# Patient Record
Sex: FEMALE | Race: WHITE | NOT HISPANIC OR LATINO
[De-identification: names, ages, dates, MRNs, and addresses within clinical notes are randomized per-mention and may not be internally consistent; named-entity substitution may affect disease eponyms.]

---

## 2019-09-13 PROBLEM — Z00.00 ENCOUNTER FOR PREVENTIVE HEALTH EXAMINATION: Status: ACTIVE | Noted: 2019-09-13

## 2019-09-20 ENCOUNTER — APPOINTMENT (OUTPATIENT)
Dept: ORTHOPEDIC SURGERY | Facility: CLINIC | Age: 69
End: 2019-09-20
Payer: MEDICARE

## 2019-09-20 DIAGNOSIS — Z80.9 FAMILY HISTORY OF MALIGNANT NEOPLASM, UNSPECIFIED: ICD-10-CM

## 2019-09-20 DIAGNOSIS — M17.11 UNILATERAL PRIMARY OSTEOARTHRITIS, RIGHT KNEE: ICD-10-CM

## 2019-09-20 DIAGNOSIS — Z87.39 PERSONAL HISTORY OF OTHER DISEASES OF THE MUSCULOSKELETAL SYSTEM AND CONNECTIVE TISSUE: ICD-10-CM

## 2019-09-20 DIAGNOSIS — Z82.61 FAMILY HISTORY OF ARTHRITIS: ICD-10-CM

## 2019-09-20 DIAGNOSIS — M25.561 PAIN IN RIGHT KNEE: ICD-10-CM

## 2019-09-20 DIAGNOSIS — Z82.62 FAMILY HISTORY OF OSTEOPOROSIS: ICD-10-CM

## 2019-09-20 DIAGNOSIS — Z86.39 PERSONAL HISTORY OF OTHER ENDOCRINE, NUTRITIONAL AND METABOLIC DISEASE: ICD-10-CM

## 2019-09-20 PROCEDURE — 99204 OFFICE O/P NEW MOD 45 MIN: CPT

## 2019-09-20 PROCEDURE — 73564 X-RAY EXAM KNEE 4 OR MORE: CPT | Mod: RT

## 2019-09-20 PROCEDURE — 73560 X-RAY EXAM OF KNEE 1 OR 2: CPT | Mod: LT

## 2019-09-20 NOTE — DISCUSSION/SUMMARY
[de-identified] : Discussed at length the nature of the patients condition. Their right knee symptoms appear secondary to degenerative arthritis. I believe she would benefit from a right TKR. The risks, benefits, convalescence and alternatives were reviewed. Numerous questions were asked and answered. Models were used as an educational tool.. Preop medical clearance. However, she has a new grandchild coming in November and may consider deferring surgery until afterwards. Therefore, we will seek authorization for right knee Euflexxa injections. Once we receive authorization, we will proceed accordingly. I also recommended PT and continuation of Mobic and Voltaren Gel. She will call the office in regards to her decision. She declined a cortisone injection today.

## 2019-09-20 NOTE — HISTORY OF PRESENT ILLNESS
[de-identified] : 69 year old female presents for initial evaluation of right knee pain for many years. She states 7 months ago she had an exacerbation of her right knee pain following a twisting injury to her right knee in February. She saw rheumatologist Dr. Marx, who placed her on Mobic and did not diagnose her with any rheumatologic conditions. 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 Her pain is located mostly medially and is sharp and dull intermittently with achy pain.  She reports buckling and clicking but denies any other mechanical symptoms. She can walk unlimited distance with pain, without a cane or walker. She uses a brace intermittently with mild relief. Patient takes the stairs one at a time using the handrail. She has utilized Mobic, CBD oils and Voltaren gel, all with mild relief. Patient had a cortisone injection and aspiration many years ago. She has no history of prior knee surgery, or injection therapy. She PT 4 months ago with mild relief. Dr. Bradshaw obtained an MRI in May, which showed a lateral posterior meniscal tear with diffuse tricompartmental degenerative arthritis.

## 2019-09-20 NOTE — PHYSICAL EXAM
[de-identified] : General appearance: well nourished and hydrated, pleasant, alert and oriented x 3, cooperative.\par HEENT: Normocephalic, EOM intact, Nasal septum midline, Oral cavity clear, External auditory canal clear.\par Cardiovascular: no apparent abnormalities, no lower leg edema, no varicosities, pedal pulses are palpable.\par Lymphatics Lymph nodes: none palpated, Lymphedema: not present.\par Neurologic: sensation is normal, no muscle weakness in upper or lower extremities, patella tendon reflexes intact .\par Dermatologic no apparent skin lesions, moist, warm, no rash.\par Spine: cervical spine appears normal and moves freely, thoracic spine appears normal and moves freely, lumbosacral spine appears normal and moves freely.\par Gait: nonantalgic.\par \par Left knee\par Inspection: no effusion or erythema.\par Wounds: none.\par Alignment: normal.\par Palpation: no specific tenderness on palpation.\par ROM active (in degrees): 0-135 with mild crepitus \par Ligamentous laxity: all ligaments appear stable,, negative ant. drawer test, negative post. drawer test, stable to varus stress test, stable to valgus stress test. negative Lachman's test, negative pivot shift test\par Meniscal Test: negative McMurrays, negative Alecia.\par Patellofemoral Alignment Test: Q angle-, normal.\par Muscle Test: good quad strength.\par Leg examination: calf is soft and non-tender.\par \par Right knee\par Inspection: no effusion or erythema.\par Wounds: none.\par Alignment: normal.\par Palpation: mild medial discomfort on palpation.\par ROM active (in degrees):  with crepitus \par Ligamentous laxity: all ligaments appear stable,, negative ant. drawer test, negative post. drawer test, stable to varus stress test, stable to valgus stress test. negative Lachman's test, negative pivot shift test\par Meniscal Test: negative McMurrays, negative Alecia.\par Patellofemoral Alignment Test: Q angle-, normal.\par Muscle Test: good quad strength.\par Leg examination: calf is soft and non-tender.\par \par Left hip\par Inspection: No swelling or ecchymosis.\par Wounds: none.\par Palpation: non-tender.\par Stability: no instability.\par Strength: 5/5 all motor groups.\par ROM: no pain with FROM.\par Leg length: equal.\par \par Right hip\par Inspection: No swelling or ecchymosis.\par Wounds: none.\par Palpation: non-tender.\par Stability: no instability.\par Strength: 5/5 all motor groups.\par ROM: no pain with FROM.\par Leg length: equal.\par \par Left ankle\par Inspection: no erythema noted, no swelling noted.\par Palpation: no pain on palpation .\par ROM: FROM without crepitus.\par Muscle strength: 5/5.\par Stability: no instability noted.\par \par Right ankle\par Inspection: no erythema noted, no swelling noted.\par ROM: FROM without crepitus.\par Palpation: no pain on palpation .\par Muscle strength: 5/5.\par Stability: no instability noted.\par \par Left foot\par Inspection: color, texture and turgor are normal.\par ROM: full range of motion of all joints without pain or crepitus.\par Palpation: no tenderness.\par Stability: no instability noted.\par \par Right foot\par Inspection: color, texture and turgor are normal.\par ROM: full range of motion of all joints without pain or crepitus.\par Palpation: no tenderness.\par Stability: no instability noted.\par \par Left shoulder\par Inspection: no muscle asymmetry, no atrophy.\par Palpation: no tenderness noted, ACJ non-tender.\par ROM: limited total elevation and abduction, otherwise FROM\par Strength testing): anterior deltoid, supraspinatus, infraspinatus, subscapularis all 5/5.\par Stability test: ant. apprehension negative, post. apprehension negative, relocation test negative.\par Impingement Test: negative NEER.\par \par Right shoulder\par Inspection: no muscle asymmetry, no atrophy.\par Palpation: no tenderness noted, ACJ non-tender.\par ROM: full active ROM, full passive ROM.\par Strength testing): anterior deltoid, supraspinatus, infraspinatus, subscapularis all 5/5.\par Stability test: ant. apprehension negative, post. apprehension negative, relocation test negative.\par Impingement Test: negative NEER.\par Surgical Wounds: none.\par \par Left elbow\par Inspection: negative swelling.\par Wounds: none.\par Palpation: non-tender.\par ROM: full ROM.\par Strength: 5/5 all groups.\par Stability: no instability.\par Mass: none.\par \par Right elbow\par Inspection: negative swelling.\par Wounds: none.\par Palpation: non-tender.\par ROM: full ROM.\par Strength: 5/5 all groups.\par Stability: no instability.\par Mass: none.\par \par Left wrist\par Inspection: negative swelling.\par Wound: none.\par Palpation (bone): no tenderness.\par ROM: full ROM.\par Strength: full , good.\par \par Right wrist\par Inspection: negative swelling.\par Wound: none.\par Palpation (bone): no tenderness.\par ROM: full ROM.\par Strength: full , good.\par \par Left hand\par Inspection: no skin changes, normal appearance.\par Wounds: none.\par Strength: full , able to make full fist.\par Sensation: light touch intact all fingers and thumb.\par Vascular: good capillary refill < 3 seconds, all fingers and thumb.\par Mass: none.\par \par Right hand\par Inspection: no skin changes, normal appearance. \par Wounds: none.\par Palpation: non-tender throughout.\par Strength: full , able to make full fist.\par Sensation: light touch intact all fingers and thumb.\par Vascular: good capillary refill < 3 seconds, all fingers and thumb.\par Mass: none.\par   [de-identified] : Left knee xray merchant view taken at the office today shows the patella in a central position with mild joint space narrowing consistent with patellofemoral arthritis \par \par Right knee xrays, standing AP/Lateral, Merchant, and 45 degree PA standing view, taken at the office today shows diffuse tricompartmental degenerative arthritis, medial joint space narrowing, sclerosis, bone on bone, patella sits at an appropriate height in a central position with joint space narrowing and osteophyte formation, Kellgren and Lucio grade 4\par  \par MRI Right knee taken 5/4/2019: Films brought in and reviewed, see attached report.\par

## 2019-09-20 NOTE — ADDENDUM
[FreeTextEntry1] : This note was written by Jessica Norris on 09/20/2019 acting as scribe for Dr. Gary Ibrahim M.D.\par \par I, Dr. Gary Ibrahim M.D., have read and attest that all the information, medical decision making and discharge instructions within are true and accurate.\par

## 2019-12-09 ENCOUNTER — APPOINTMENT (OUTPATIENT)
Dept: CARDIOLOGY | Facility: CLINIC | Age: 69
End: 2019-12-09
Payer: MEDICARE

## 2019-12-09 ENCOUNTER — NON-APPOINTMENT (OUTPATIENT)
Age: 69
End: 2019-12-09

## 2019-12-09 VITALS
SYSTOLIC BLOOD PRESSURE: 154 MMHG | BODY MASS INDEX: 27.88 KG/M2 | OXYGEN SATURATION: 97 % | HEIGHT: 60 IN | HEART RATE: 67 BPM | WEIGHT: 142 LBS | DIASTOLIC BLOOD PRESSURE: 91 MMHG

## 2019-12-09 DIAGNOSIS — E78.5 HYPERLIPIDEMIA, UNSPECIFIED: ICD-10-CM

## 2019-12-09 PROCEDURE — 99204 OFFICE O/P NEW MOD 45 MIN: CPT

## 2019-12-09 PROCEDURE — 93000 ELECTROCARDIOGRAM COMPLETE: CPT

## 2019-12-09 RX ORDER — TERBUTALINE SULFATE 1 MG/ML
VIAL (ML) SUBCUTANEOUS
Refills: 0 | Status: ACTIVE | COMMUNITY

## 2019-12-09 NOTE — REASON FOR VISIT
[Initial Evaluation] : an initial evaluation of [Hyperlipidemia] : hyperlipidemia [FreeTextEntry1] : Preop

## 2019-12-09 NOTE — DISCUSSION/SUMMARY
[FreeTextEntry1] : This is a 69-year-old white female who has hyperlipidemia treated with Zetia. Otherwise she has no risk factors for heart disease. She has very good functional status without any cardiac symptoms.\par \par I would like to review blood work, echo and carotid Doppler from your office. Once I review these tests I would decide if any further testing is necessary prior to her knee replacement surgery.\par \par Blood pressure is elevated in my office today but she seems to be nervous and does have anxiety issues. According to the patient blood pressure in your office has been normal.\par \par This was discussed with the patient and I answered her questions.

## 2019-12-09 NOTE — HISTORY OF PRESENT ILLNESS
[FreeTextEntry1] : I saw Karli Larios in the office today for cardiac evaluation. She is a 69-year-old white female has been in good general health. Specifically she has no history of hypertension, diabetes, smoking, or family history of heart disease. She does take Zetia for hyperlipidemia. She also has asthma that has been under good control and has had no exacerbations requiring ER visits or hospitalizations.\par \par Patient has had good functional status up until recently when she hurt her knee and now requires left knee replacement. As a result she is now having difficulty. She still is physically active and walks on a daily basis. She was participating in aerobic exercise class and hour and a half every day, and walks her dog several times a day and has no chest pain, shortness of breath, or palpitation.\par \par The patient has had echo and carotid Doppler in your office. I would appreciate a copy for my review. Also will  blood work.\par \par She is scheduled for knee replacement surgery in January.\par \par Resting 12 lead electrocardiogram demonstrates sinus rhythm and appears to be normal.

## 2019-12-09 NOTE — PHYSICAL EXAM
[General Appearance - Well Developed] : well developed [Normal Appearance] : normal appearance [Well Groomed] : well groomed [General Appearance - Well Nourished] : well nourished [No Deformities] : no deformities [Normal Oral Mucosa] : normal oral mucosa [General Appearance - In No Acute Distress] : no acute distress [Normal Conjunctiva] : the conjunctiva exhibited no abnormalities [Normal Jugular Venous V Waves Present] : normal jugular venous V waves present [No Jugular Venous Edwards A Waves] : no jugular venous edwards A waves [Normal Jugular Venous A Waves Present] : normal jugular venous A waves present [Not Palpable] : not palpable [Normal Rate] : normal [Normal S1] : normal S1 [Normal S2] : normal S2 [2+] : left 2+ [No Abnormalities] : the abdominal aorta was not enlarged and no bruit was heard [No Pitting Edema] : no pitting edema present [1+] : left 1+ [Respiration, Rhythm And Depth] : normal respiratory rhythm and effort [Exaggerated Use Of Accessory Muscles For Inspiration] : no accessory muscle use [Bowel Sounds] : normal bowel sounds [Auscultation Breath Sounds / Voice Sounds] : lungs were clear to auscultation bilaterally [Abdomen Soft] : soft [Abnormal Walk] : normal gait [Abdomen Tenderness] : non-tender [Nail Clubbing] : no clubbing of the fingernails [Cyanosis, Localized] : no localized cyanosis [Gait - Sufficient For Exercise Testing] : the gait was sufficient for exercise testing [Skin Color & Pigmentation] : normal skin color and pigmentation [Skin Turgor] : normal skin turgor [Oriented To Time, Place, And Person] : oriented to person, place, and time [] : no rash [No Anxiety] : not feeling anxious [Impaired Insight] : insight and judgment were intact [II] : a grade 2 [S3] : no S3 [S4] : no S4 [Right Carotid Bruit] : no bruit heard over the right carotid [Left Carotid Bruit] : no bruit heard over the left carotid [Left Femoral Bruit] : no bruit heard over the left femoral artery [Right Femoral Bruit] : no bruit heard over the right femoral artery

## 2020-01-08 ENCOUNTER — OUTPATIENT (OUTPATIENT)
Dept: OUTPATIENT SERVICES | Facility: HOSPITAL | Age: 70
LOS: 1 days | End: 2020-01-08
Payer: MEDICARE

## 2020-01-08 VITALS
HEART RATE: 75 BPM | SYSTOLIC BLOOD PRESSURE: 143 MMHG | TEMPERATURE: 98 F | RESPIRATION RATE: 16 BRPM | WEIGHT: 141.98 LBS | HEIGHT: 60 IN | DIASTOLIC BLOOD PRESSURE: 66 MMHG | OXYGEN SATURATION: 99 %

## 2020-01-08 DIAGNOSIS — Z29.9 ENCOUNTER FOR PROPHYLACTIC MEASURES, UNSPECIFIED: ICD-10-CM

## 2020-01-08 DIAGNOSIS — Z98.890 OTHER SPECIFIED POSTPROCEDURAL STATES: Chronic | ICD-10-CM

## 2020-01-08 DIAGNOSIS — Z01.818 ENCOUNTER FOR OTHER PREPROCEDURAL EXAMINATION: ICD-10-CM

## 2020-01-08 DIAGNOSIS — Z98.891 HISTORY OF UTERINE SCAR FROM PREVIOUS SURGERY: Chronic | ICD-10-CM

## 2020-01-08 DIAGNOSIS — M17.11 UNILATERAL PRIMARY OSTEOARTHRITIS, RIGHT KNEE: ICD-10-CM

## 2020-01-08 LAB
ANION GAP SERPL CALC-SCNC: 4 MMOL/L — LOW (ref 5–17)
APTT BLD: 29.4 SEC — SIGNIFICANT CHANGE UP (ref 27.5–36.3)
BASOPHILS # BLD AUTO: 0.04 K/UL — SIGNIFICANT CHANGE UP (ref 0–0.2)
BASOPHILS NFR BLD AUTO: 0.4 % — SIGNIFICANT CHANGE UP (ref 0–2)
BUN SERPL-MCNC: 26 MG/DL — HIGH (ref 7–23)
CALCIUM SERPL-MCNC: 9.1 MG/DL — SIGNIFICANT CHANGE UP (ref 8.5–10.1)
CHLORIDE SERPL-SCNC: 107 MMOL/L — SIGNIFICANT CHANGE UP (ref 96–108)
CO2 SERPL-SCNC: 28 MMOL/L — SIGNIFICANT CHANGE UP (ref 22–31)
CREAT SERPL-MCNC: 0.96 MG/DL — SIGNIFICANT CHANGE UP (ref 0.5–1.3)
EOSINOPHIL # BLD AUTO: 0 K/UL — SIGNIFICANT CHANGE UP (ref 0–0.5)
EOSINOPHIL NFR BLD AUTO: 0 % — SIGNIFICANT CHANGE UP (ref 0–6)
GLUCOSE SERPL-MCNC: 96 MG/DL — SIGNIFICANT CHANGE UP (ref 70–99)
HCT VFR BLD CALC: 37.5 % — SIGNIFICANT CHANGE UP (ref 34.5–45)
HGB BLD-MCNC: 13.2 G/DL — SIGNIFICANT CHANGE UP (ref 11.5–15.5)
IMM GRANULOCYTES NFR BLD AUTO: 0.4 % — SIGNIFICANT CHANGE UP (ref 0–1.5)
INR BLD: 1.04 RATIO — SIGNIFICANT CHANGE UP (ref 0.88–1.16)
LYMPHOCYTES # BLD AUTO: 1.97 K/UL — SIGNIFICANT CHANGE UP (ref 1–3.3)
LYMPHOCYTES # BLD AUTO: 18.5 % — SIGNIFICANT CHANGE UP (ref 13–44)
MCHC RBC-ENTMCNC: 32.4 PG — SIGNIFICANT CHANGE UP (ref 27–34)
MCHC RBC-ENTMCNC: 35.2 GM/DL — SIGNIFICANT CHANGE UP (ref 32–36)
MCV RBC AUTO: 92.1 FL — SIGNIFICANT CHANGE UP (ref 80–100)
MONOCYTES # BLD AUTO: 1.03 K/UL — HIGH (ref 0–0.9)
MONOCYTES NFR BLD AUTO: 9.7 % — SIGNIFICANT CHANGE UP (ref 2–14)
NEUTROPHILS # BLD AUTO: 7.56 K/UL — HIGH (ref 1.8–7.4)
NEUTROPHILS NFR BLD AUTO: 71 % — SIGNIFICANT CHANGE UP (ref 43–77)
PLATELET # BLD AUTO: 298 K/UL — SIGNIFICANT CHANGE UP (ref 150–400)
POTASSIUM SERPL-MCNC: 4.5 MMOL/L — SIGNIFICANT CHANGE UP (ref 3.5–5.3)
POTASSIUM SERPL-SCNC: 4.5 MMOL/L — SIGNIFICANT CHANGE UP (ref 3.5–5.3)
PROTHROM AB SERPL-ACNC: 11.6 SEC — SIGNIFICANT CHANGE UP (ref 10–12.9)
RBC # BLD: 4.07 M/UL — SIGNIFICANT CHANGE UP (ref 3.8–5.2)
RBC # FLD: 12.6 % — SIGNIFICANT CHANGE UP (ref 10.3–14.5)
SODIUM SERPL-SCNC: 139 MMOL/L — SIGNIFICANT CHANGE UP (ref 135–145)
WBC # BLD: 10.64 K/UL — HIGH (ref 3.8–10.5)
WBC # FLD AUTO: 10.64 K/UL — HIGH (ref 3.8–10.5)

## 2020-01-08 PROCEDURE — 71046 X-RAY EXAM CHEST 2 VIEWS: CPT

## 2020-01-08 PROCEDURE — 85025 COMPLETE CBC W/AUTO DIFF WBC: CPT

## 2020-01-08 PROCEDURE — 80048 BASIC METABOLIC PNL TOTAL CA: CPT

## 2020-01-08 PROCEDURE — 86900 BLOOD TYPING SEROLOGIC ABO: CPT

## 2020-01-08 PROCEDURE — 86850 RBC ANTIBODY SCREEN: CPT

## 2020-01-08 PROCEDURE — G0463: CPT | Mod: 25

## 2020-01-08 PROCEDURE — 93005 ELECTROCARDIOGRAM TRACING: CPT

## 2020-01-08 PROCEDURE — 87641 MR-STAPH DNA AMP PROBE: CPT

## 2020-01-08 PROCEDURE — 36415 COLL VENOUS BLD VENIPUNCTURE: CPT

## 2020-01-08 PROCEDURE — 86901 BLOOD TYPING SEROLOGIC RH(D): CPT

## 2020-01-08 PROCEDURE — 83036 HEMOGLOBIN GLYCOSYLATED A1C: CPT

## 2020-01-08 PROCEDURE — 93010 ELECTROCARDIOGRAM REPORT: CPT

## 2020-01-08 PROCEDURE — 87640 STAPH A DNA AMP PROBE: CPT

## 2020-01-08 PROCEDURE — 85730 THROMBOPLASTIN TIME PARTIAL: CPT

## 2020-01-08 PROCEDURE — 71046 X-RAY EXAM CHEST 2 VIEWS: CPT | Mod: 26

## 2020-01-08 PROCEDURE — 85610 PROTHROMBIN TIME: CPT

## 2020-01-08 NOTE — H&P PST ADULT - NSICDXFAMILYHX_GEN_ALL_CORE_FT
FAMILY HISTORY:  FH: colon cancer, mother  FH: hyperlipidemia, mother and father  FH: hypertension, mother

## 2020-01-08 NOTE — H&P PST ADULT - NS NEC GEN PE MLT EXAM PC
detailed exam Trilobed Flap Text: The defect edges were debeveled with a #15 scalpel blade.  Given the location of the defect and the proximity to free margins a trilobed flap was deemed most appropriate.  Using a sterile surgical marker, an appropriate trilobed flap drawn around the defect.    The area thus outlined was incised deep to adipose tissue with a #15 scalpel blade.  The skin margins were undermined to an appropriate distance in all directions utilizing iris scissors.

## 2020-01-08 NOTE — H&P PST ADULT - NSICDXPASTSURGICALHX_GEN_ALL_CORE_FT
PAST SURGICAL HISTORY:  H/O shoulder surgery left due to fracture    H/O:      History of breast surgery excision of papilloma right breast x 2    S/P repair of paraesophageal hernia

## 2020-01-08 NOTE — H&P PST ADULT - NSANTHOSAYNRD_GEN_A_CORE
No. LORRAINE screening performed.  STOP BANG Legend: 0-2 = LOW Risk; 3-4 = INTERMEDIATE Risk; 5-8 = HIGH Risk

## 2020-01-08 NOTE — H&P PST ADULT - ASSESSMENT
69 year old female presents to PST for planned right TKR    Plan:  1. PST instructions given ; NPO post midnight   2. Pt instructed to take following meds with sip of water : levothyroxine terbutaline ; pt to administer flonase Qvar Azeslastine   3. EZ wash instructions given & mupirocin instructions given ( only for positive nasal culture)   4. Medical Optimization  with Dr Geronimo Benavides   5. Stop NSAIDS ( Aspirin Alev Motrin Mobic Diclofenac), herbal supplements , MVI , Vitamin fish oil 7 days prior to surgery  unless directed by surgeon or cardiologist;   6. Labs EKG CXR as per surgeon request       CAPRINI SCORE [CLOT]    AGE RELATED RISK FACTORS                                                       MOBILITY RELATED FACTORS  [ ] Age 41-60 years                                            (1 Point)                  [ ] Bed rest                                                        (1 Point)  [x ] Age: 61-74 years                                           (2 Points)                 [ ] Plaster cast                                                   (2 Points)  [ ] Age= 75 years                                              (3 Points)                 [ ] Bed bound for more than 72 hours                 (2 Points)    DISEASE RELATED RISK FACTORS                                               GENDER SPECIFIC FACTORS  [ ] Edema in the lower extremities                       (1 Point)                  [ ] Pregnancy                                                     (1 Point)  [ ] Varicose veins                                               (1 Point)                  [ ] Post-partum < 6 weeks                                   (1 Point)             [ x] BMI > 25 Kg/m2                                            (1 Point)                  [ ] Hormonal therapy  or oral contraception          (1 Point)                 [ ] Sepsis (in the previous month)                        (1 Point)                  [ ] History of pregnancy complications                 (1 point)  [ ] Pneumonia or serious lung disease                                               [ ] Unexplained or recurrent                     (1 Point)           (in the previous month)                               (1 Point)  [ ] Abnormal pulmonary function test                     (1 Point)                 SURGERY RELATED RISK FACTORS  [ ] Acute myocardial infarction                              (1 Point)                 [ ]  Section                                             (1 Point)  [ ] Congestive heart failure (in the previous month)  (1 Point)               [ ] Minor surgery                                                  (1 Point)   [ ] Inflammatory bowel disease                             (1 Point)                 [ ] Arthroscopic surgery                                        (2 Points)  [ ] Central venous access                                      (2 Points)                [ ] General surgery lasting more than 45 minutes   (2 Points)       [ ] Stroke (in the previous month)                          (5 Points)               [x ] Elective arthroplasty                                         (5 Points)            ( ) past and present malignancy                             ( 2 points)                                                                                                                                    HEMATOLOGY RELATED FACTORS                                                 TRAUMA RELATED RISK FACTORS  [ ] Prior episodes of VTE                                     (3 Points)                 [ ] Fracture of the hip, pelvis, or leg                       (5 Points)  [ ] Positive family history for VTE                         (3 Points)                 [ ] Acute spinal cord injury (in the previous month)  (5 Points)  [ ] Prothrombin 52791 A                                     (3 Points)                 [ ] Paralysis  (less than 1 month)                             (5 Points)  [ ] Factor V Leiden                                             (3 Points)                  [ ] Multiple Trauma within 1 month                        (5 Points)  [ ] Lupus anticoagulants                                     (3 Points)                                                           [ ] Anticardiolipin antibodies                               (3 Points)                                                       [ ] High homocysteine in the blood                      (3 Points)                                             [ ] Other congenital or acquired thrombophilia      (3 Points)                                                [ ] Heparin induced thrombocytopenia                  (3 Points)                                          Total Score [       8   ]

## 2020-01-08 NOTE — H&P PST ADULT - HISTORY OF PRESENT ILLNESS
69 year old female with OA right knee c/0 right knee pain with ROM; pt has had cortisone injection without relief of pain ; she presents to presents to PST for planned right TKR

## 2020-01-08 NOTE — H&P PST ADULT - NSICDXPASTMEDICALHX_GEN_ALL_CORE_FT
PAST MEDICAL HISTORY:  Bronchial asthma last exacerbation 1 year ago; never intubated    Cataract, left     Constipation     Environmental and seasonal allergies     Heart murmur     HLD (hyperlipidemia)     Hypothyroidism     OA (osteoarthritis)     Paraesophageal hiatal hernia repair done    Psoriasis

## 2020-01-09 DIAGNOSIS — M17.11 UNILATERAL PRIMARY OSTEOARTHRITIS, RIGHT KNEE: ICD-10-CM

## 2020-01-09 DIAGNOSIS — Z01.818 ENCOUNTER FOR OTHER PREPROCEDURAL EXAMINATION: ICD-10-CM

## 2020-01-09 LAB
HBA1C BLD-MCNC: 5.5 % — SIGNIFICANT CHANGE UP (ref 4–5.6)
MRSA PCR RESULT.: SIGNIFICANT CHANGE UP
S AUREUS DNA NOSE QL NAA+PROBE: SIGNIFICANT CHANGE UP

## 2020-01-17 RX ORDER — PANTOPRAZOLE SODIUM 20 MG/1
40 TABLET, DELAYED RELEASE ORAL ONCE
Refills: 0 | Status: DISCONTINUED | OUTPATIENT
Start: 2020-03-10 | End: 2020-03-12

## 2020-02-21 PROBLEM — R01.1 CARDIAC MURMUR, UNSPECIFIED: Chronic | Status: ACTIVE | Noted: 2020-01-08

## 2020-02-21 PROBLEM — E03.9 HYPOTHYROIDISM, UNSPECIFIED: Chronic | Status: ACTIVE | Noted: 2020-01-08

## 2020-02-21 PROBLEM — M19.90 UNSPECIFIED OSTEOARTHRITIS, UNSPECIFIED SITE: Chronic | Status: ACTIVE | Noted: 2020-01-08

## 2020-02-21 PROBLEM — K59.00 CONSTIPATION, UNSPECIFIED: Chronic | Status: ACTIVE | Noted: 2020-01-08

## 2020-02-21 PROBLEM — L40.9 PSORIASIS, UNSPECIFIED: Chronic | Status: ACTIVE | Noted: 2020-01-08

## 2020-02-21 PROBLEM — H26.9 UNSPECIFIED CATARACT: Chronic | Status: ACTIVE | Noted: 2020-01-08

## 2020-02-21 PROBLEM — J30.89 OTHER ALLERGIC RHINITIS: Chronic | Status: ACTIVE | Noted: 2020-01-08

## 2020-02-21 PROBLEM — E78.5 HYPERLIPIDEMIA, UNSPECIFIED: Chronic | Status: ACTIVE | Noted: 2020-01-08

## 2020-03-04 ENCOUNTER — OUTPATIENT (OUTPATIENT)
Dept: OUTPATIENT SERVICES | Facility: HOSPITAL | Age: 70
LOS: 1 days | End: 2020-03-04
Payer: MEDICARE

## 2020-03-04 VITALS
OXYGEN SATURATION: 99 % | WEIGHT: 137.79 LBS | RESPIRATION RATE: 16 BRPM | TEMPERATURE: 98 F | HEIGHT: 59 IN | DIASTOLIC BLOOD PRESSURE: 74 MMHG | HEART RATE: 78 BPM | SYSTOLIC BLOOD PRESSURE: 152 MMHG

## 2020-03-04 DIAGNOSIS — Z98.890 OTHER SPECIFIED POSTPROCEDURAL STATES: Chronic | ICD-10-CM

## 2020-03-04 DIAGNOSIS — M17.11 UNILATERAL PRIMARY OSTEOARTHRITIS, RIGHT KNEE: ICD-10-CM

## 2020-03-04 DIAGNOSIS — Z98.891 HISTORY OF UTERINE SCAR FROM PREVIOUS SURGERY: Chronic | ICD-10-CM

## 2020-03-04 DIAGNOSIS — Z01.818 ENCOUNTER FOR OTHER PREPROCEDURAL EXAMINATION: ICD-10-CM

## 2020-03-04 LAB
ANION GAP SERPL CALC-SCNC: 2 MMOL/L — LOW (ref 5–17)
BASOPHILS # BLD AUTO: 0.03 K/UL — SIGNIFICANT CHANGE UP (ref 0–0.2)
BASOPHILS NFR BLD AUTO: 0.3 % — SIGNIFICANT CHANGE UP (ref 0–2)
BUN SERPL-MCNC: 16 MG/DL — SIGNIFICANT CHANGE UP (ref 7–23)
CALCIUM SERPL-MCNC: 9.1 MG/DL — SIGNIFICANT CHANGE UP (ref 8.5–10.1)
CHLORIDE SERPL-SCNC: 105 MMOL/L — SIGNIFICANT CHANGE UP (ref 96–108)
CO2 SERPL-SCNC: 26 MMOL/L — SIGNIFICANT CHANGE UP (ref 22–31)
CREAT SERPL-MCNC: 0.89 MG/DL — SIGNIFICANT CHANGE UP (ref 0.5–1.3)
EOSINOPHIL # BLD AUTO: 0 K/UL — SIGNIFICANT CHANGE UP (ref 0–0.5)
EOSINOPHIL NFR BLD AUTO: 0 % — SIGNIFICANT CHANGE UP (ref 0–6)
GLUCOSE SERPL-MCNC: 98 MG/DL — SIGNIFICANT CHANGE UP (ref 70–99)
HBA1C BLD-MCNC: 5.5 % — SIGNIFICANT CHANGE UP (ref 4–5.6)
HCT VFR BLD CALC: 34.4 % — LOW (ref 34.5–45)
HGB BLD-MCNC: 11.8 G/DL — SIGNIFICANT CHANGE UP (ref 11.5–15.5)
IMM GRANULOCYTES NFR BLD AUTO: 0.3 % — SIGNIFICANT CHANGE UP (ref 0–1.5)
INR BLD: 1.07 RATIO — SIGNIFICANT CHANGE UP (ref 0.88–1.16)
LYMPHOCYTES # BLD AUTO: 1.16 K/UL — SIGNIFICANT CHANGE UP (ref 1–3.3)
LYMPHOCYTES # BLD AUTO: 13.1 % — SIGNIFICANT CHANGE UP (ref 13–44)
MCHC RBC-ENTMCNC: 31.5 PG — SIGNIFICANT CHANGE UP (ref 27–34)
MCHC RBC-ENTMCNC: 34.3 GM/DL — SIGNIFICANT CHANGE UP (ref 32–36)
MCV RBC AUTO: 91.7 FL — SIGNIFICANT CHANGE UP (ref 80–100)
MONOCYTES # BLD AUTO: 0.63 K/UL — SIGNIFICANT CHANGE UP (ref 0–0.9)
MONOCYTES NFR BLD AUTO: 7.1 % — SIGNIFICANT CHANGE UP (ref 2–14)
MRSA PCR RESULT.: SIGNIFICANT CHANGE UP
NEUTROPHILS # BLD AUTO: 7.03 K/UL — SIGNIFICANT CHANGE UP (ref 1.8–7.4)
NEUTROPHILS NFR BLD AUTO: 79.2 % — HIGH (ref 43–77)
PLATELET # BLD AUTO: 328 K/UL — SIGNIFICANT CHANGE UP (ref 150–400)
POTASSIUM SERPL-MCNC: 4.5 MMOL/L — SIGNIFICANT CHANGE UP (ref 3.5–5.3)
POTASSIUM SERPL-SCNC: 4.5 MMOL/L — SIGNIFICANT CHANGE UP (ref 3.5–5.3)
PROTHROM AB SERPL-ACNC: 11.9 SEC — SIGNIFICANT CHANGE UP (ref 10–12.9)
RBC # BLD: 3.75 M/UL — LOW (ref 3.8–5.2)
RBC # FLD: 12.4 % — SIGNIFICANT CHANGE UP (ref 10.3–14.5)
S AUREUS DNA NOSE QL NAA+PROBE: SIGNIFICANT CHANGE UP
SODIUM SERPL-SCNC: 133 MMOL/L — LOW (ref 135–145)
WBC # BLD: 8.88 K/UL — SIGNIFICANT CHANGE UP (ref 3.8–10.5)
WBC # FLD AUTO: 8.88 K/UL — SIGNIFICANT CHANGE UP (ref 3.8–10.5)

## 2020-03-04 PROCEDURE — 85610 PROTHROMBIN TIME: CPT

## 2020-03-04 PROCEDURE — 86900 BLOOD TYPING SEROLOGIC ABO: CPT

## 2020-03-04 PROCEDURE — 80048 BASIC METABOLIC PNL TOTAL CA: CPT

## 2020-03-04 PROCEDURE — G0463: CPT | Mod: 25

## 2020-03-04 PROCEDURE — 87640 STAPH A DNA AMP PROBE: CPT

## 2020-03-04 PROCEDURE — 36415 COLL VENOUS BLD VENIPUNCTURE: CPT

## 2020-03-04 PROCEDURE — 83036 HEMOGLOBIN GLYCOSYLATED A1C: CPT

## 2020-03-04 PROCEDURE — 71046 X-RAY EXAM CHEST 2 VIEWS: CPT | Mod: 26

## 2020-03-04 PROCEDURE — 87641 MR-STAPH DNA AMP PROBE: CPT

## 2020-03-04 PROCEDURE — 85025 COMPLETE CBC W/AUTO DIFF WBC: CPT

## 2020-03-04 PROCEDURE — 86901 BLOOD TYPING SEROLOGIC RH(D): CPT

## 2020-03-04 PROCEDURE — 71046 X-RAY EXAM CHEST 2 VIEWS: CPT

## 2020-03-04 PROCEDURE — 86850 RBC ANTIBODY SCREEN: CPT

## 2020-03-04 NOTE — H&P PST ADULT - HISTORY OF PRESENT ILLNESS
69 year old female with OA right knee c/0 right knee pain with ROM; pt has had cortisone injection without relief of pain ; she presents to presents to PST for planned right TKR   Patients surgery cancelled due to bronchitis 69 year old female with OA right knee c/0 right knee pain with ROM; pt has had cortisone injection without relief of pain ; she presents to presents to PST for planned right TKR   Patients surgery cancelled due to bronchitis ; pt has had  2 courses of antibiotic and steroids she said

## 2020-03-04 NOTE — H&P PST ADULT - ASSESSMENT
69 year old female presents to PST for planned right TKR    Plan:  1. PST instructions given ; NPO post midnight   2. Pt instructed to take following meds with sip of water : levothyroxine terbutaline ; pt to administer flonase Qvar Azeslastine   3. EZ wash instructions given & mupirocin instructions given ( only for positive nasal culture)   4. Medical Optimization  with Dr Geronimo Benavides   5. Stop NSAIDS ( Aspirin Alev Motrin Mobic Diclofenac), herbal supplements , MVI , Vitamin fish oil 7 days prior to surgery  unless directed by surgeon or cardiologist;   6. Labs EKG CXR as per surgeon request       CAPRINI SCORE [CLOT]    AGE RELATED RISK FACTORS                                                       MOBILITY RELATED FACTORS  [ ] Age 41-60 years                                            (1 Point)                  [ ] Bed rest                                                        (1 Point)  [x ] Age: 61-74 years                                           (2 Points)                 [ ] Plaster cast                                                   (2 Points)  [ ] Age= 75 years                                              (3 Points)                 [ ] Bed bound for more than 72 hours                 (2 Points)    DISEASE RELATED RISK FACTORS                                               GENDER SPECIFIC FACTORS  [ ] Edema in the lower extremities                       (1 Point)                  [ ] Pregnancy                                                     (1 Point)  [ ] Varicose veins                                               (1 Point)                  [ ] Post-partum < 6 weeks                                   (1 Point)             [ x] BMI > 25 Kg/m2                                            (1 Point)                  [ ] Hormonal therapy  or oral contraception          (1 Point)                 [ ] Sepsis (in the previous month)                        (1 Point)                  [ ] History of pregnancy complications                 (1 point)  [ ] Pneumonia or serious lung disease                                               [ ] Unexplained or recurrent                     (1 Point)           (in the previous month)                               (1 Point)  [ ] Abnormal pulmonary function test                     (1 Point)                 SURGERY RELATED RISK FACTORS  [ ] Acute myocardial infarction                              (1 Point)                 [ ]  Section                                             (1 Point)  [ ] Congestive heart failure (in the previous month)  (1 Point)               [ ] Minor surgery                                                  (1 Point)   [ ] Inflammatory bowel disease                             (1 Point)                 [ ] Arthroscopic surgery                                        (2 Points)  [ ] Central venous access                                      (2 Points)                [ ] General surgery lasting more than 45 minutes   (2 Points)       [ ] Stroke (in the previous month)                          (5 Points)               [x ] Elective arthroplasty                                         (5 Points)            ( ) past and present malignancy                             ( 2 points)                                                                                                                                    HEMATOLOGY RELATED FACTORS                                                 TRAUMA RELATED RISK FACTORS  [ ] Prior episodes of VTE                                     (3 Points)                 [ ] Fracture of the hip, pelvis, or leg                       (5 Points)  [ ] Positive family history for VTE                         (3 Points)                 [ ] Acute spinal cord injury (in the previous month)  (5 Points)  [ ] Prothrombin 12638 A                                     (3 Points)                 [ ] Paralysis  (less than 1 month)                             (5 Points)  [ ] Factor V Leiden                                             (3 Points)                  [ ] Multiple Trauma within 1 month                        (5 Points)  [ ] Lupus anticoagulants                                     (3 Points)                                                           [ ] Anticardiolipin antibodies                               (3 Points)                                                       [ ] High homocysteine in the blood                      (3 Points)                                             [ ] Other congenital or acquired thrombophilia      (3 Points)                                                [ ] Heparin induced thrombocytopenia                  (3 Points)                                          Total Score [       8   ] 69 year old female presents to PST for planned right TKR    Plan:  1. PST instructions given ; NPO post midnight   2. Pt instructed to take following meds with sip of water : levothyroxine terbutaline ; pt to administer flonase Qvar Azeslastine ; systane eye drops  3. EZ wash instructions given & mupirocin instructions given ( only for positive nasal culture)   4. Medical Optimization  with Dr Geronimo Benavides   5. Stop NSAIDS ( Aspirin Alev Motrin Mobic Diclofenac), herbal supplements , MVI , Vitamin fish oil 7 days prior to surgery  unless directed by surgeon or cardiologist;   6. Labs EKG CXR as per surgeon request       CAPRINI SCORE [CLOT]    AGE RELATED RISK FACTORS                                                       MOBILITY RELATED FACTORS  [ ] Age 41-60 years                                            (1 Point)                  [ ] Bed rest                                                        (1 Point)  [x ] Age: 61-74 years                                           (2 Points)                 [ ] Plaster cast                                                   (2 Points)  [ ] Age= 75 years                                              (3 Points)                 [ ] Bed bound for more than 72 hours                 (2 Points)    DISEASE RELATED RISK FACTORS                                               GENDER SPECIFIC FACTORS  [ ] Edema in the lower extremities                       (1 Point)                  [ ] Pregnancy                                                     (1 Point)  [ ] Varicose veins                                               (1 Point)                  [ ] Post-partum < 6 weeks                                   (1 Point)             [ x] BMI > 25 Kg/m2                                            (1 Point)                  [ ] Hormonal therapy  or oral contraception          (1 Point)                 [ ] Sepsis (in the previous month)                        (1 Point)                  [ ] History of pregnancy complications                 (1 point)  [ ] Pneumonia or serious lung disease                                               [ ] Unexplained or recurrent                     (1 Point)           (in the previous month)                               (1 Point)  [ ] Abnormal pulmonary function test                     (1 Point)                 SURGERY RELATED RISK FACTORS  [ ] Acute myocardial infarction                              (1 Point)                 [ ]  Section                                             (1 Point)  [ ] Congestive heart failure (in the previous month)  (1 Point)               [ ] Minor surgery                                                  (1 Point)   [ ] Inflammatory bowel disease                             (1 Point)                 [ ] Arthroscopic surgery                                        (2 Points)  [ ] Central venous access                                      (2 Points)                [ ] General surgery lasting more than 45 minutes   (2 Points)       [ ] Stroke (in the previous month)                          (5 Points)               [x ] Elective arthroplasty                                         (5 Points)            ( ) past and present malignancy                             ( 2 points)                                                                                                                                    HEMATOLOGY RELATED FACTORS                                                 TRAUMA RELATED RISK FACTORS  [ ] Prior episodes of VTE                                     (3 Points)                 [ ] Fracture of the hip, pelvis, or leg                       (5 Points)  [ ] Positive family history for VTE                         (3 Points)                 [ ] Acute spinal cord injury (in the previous month)  (5 Points)  [ ] Prothrombin 25764 A                                     (3 Points)                 [ ] Paralysis  (less than 1 month)                             (5 Points)  [ ] Factor V Leiden                                             (3 Points)                  [ ] Multiple Trauma within 1 month                        (5 Points)  [ ] Lupus anticoagulants                                     (3 Points)                                                           [ ] Anticardiolipin antibodies                               (3 Points)                                                       [ ] High homocysteine in the blood                      (3 Points)                                             [ ] Other congenital or acquired thrombophilia      (3 Points)                                                [ ] Heparin induced thrombocytopenia                  (3 Points)                                          Total Score [       8   ]

## 2020-03-05 DIAGNOSIS — Z01.818 ENCOUNTER FOR OTHER PREPROCEDURAL EXAMINATION: ICD-10-CM

## 2020-03-05 DIAGNOSIS — M17.11 UNILATERAL PRIMARY OSTEOARTHRITIS, RIGHT KNEE: ICD-10-CM

## 2020-03-06 ENCOUNTER — APPOINTMENT (OUTPATIENT)
Dept: ORTHOPEDIC SURGERY | Facility: CLINIC | Age: 70
End: 2020-03-06

## 2020-03-09 RX ORDER — OXYCODONE HYDROCHLORIDE 5 MG/1
5 TABLET ORAL ONCE
Refills: 0 | Status: DISCONTINUED | OUTPATIENT
Start: 2020-03-10 | End: 2020-03-10

## 2020-03-09 RX ORDER — SODIUM CHLORIDE 9 MG/ML
1000 INJECTION, SOLUTION INTRAVENOUS
Refills: 0 | Status: DISCONTINUED | OUTPATIENT
Start: 2020-03-10 | End: 2020-03-10

## 2020-03-09 RX ORDER — FAMOTIDINE 10 MG/ML
20 INJECTION INTRAVENOUS ONCE
Refills: 0 | Status: DISCONTINUED | OUTPATIENT
Start: 2020-03-10 | End: 2020-03-12

## 2020-03-09 RX ORDER — SODIUM CHLORIDE 9 MG/ML
3 INJECTION INTRAMUSCULAR; INTRAVENOUS; SUBCUTANEOUS EVERY 8 HOURS
Refills: 0 | Status: DISCONTINUED | OUTPATIENT
Start: 2020-03-10 | End: 2020-03-12

## 2020-03-09 RX ORDER — FENTANYL CITRATE 50 UG/ML
50 INJECTION INTRAVENOUS
Refills: 0 | Status: DISCONTINUED | OUTPATIENT
Start: 2020-03-10 | End: 2020-03-10

## 2020-03-09 RX ORDER — ACETAMINOPHEN 500 MG
975 TABLET ORAL ONCE
Refills: 0 | Status: DISCONTINUED | OUTPATIENT
Start: 2020-03-10 | End: 2020-03-12

## 2020-03-09 RX ORDER — ONDANSETRON 8 MG/1
4 TABLET, FILM COATED ORAL EVERY 6 HOURS
Refills: 0 | Status: DISCONTINUED | OUTPATIENT
Start: 2020-03-10 | End: 2020-03-10

## 2020-03-10 ENCOUNTER — RESULT REVIEW (OUTPATIENT)
Age: 70
End: 2020-03-10

## 2020-03-10 ENCOUNTER — TRANSCRIPTION ENCOUNTER (OUTPATIENT)
Age: 70
End: 2020-03-10

## 2020-03-10 ENCOUNTER — INPATIENT (INPATIENT)
Facility: HOSPITAL | Age: 70
LOS: 1 days | Discharge: HOME HEALTH | DRG: 470 | End: 2020-03-12
Attending: ORTHOPAEDIC SURGERY | Admitting: ORTHOPAEDIC SURGERY
Payer: MEDICARE

## 2020-03-10 VITALS
RESPIRATION RATE: 14 BRPM | HEIGHT: 59 IN | SYSTOLIC BLOOD PRESSURE: 159 MMHG | DIASTOLIC BLOOD PRESSURE: 79 MMHG | OXYGEN SATURATION: 98 % | TEMPERATURE: 99 F | HEART RATE: 93 BPM | WEIGHT: 137.79 LBS

## 2020-03-10 DIAGNOSIS — Z98.890 OTHER SPECIFIED POSTPROCEDURAL STATES: Chronic | ICD-10-CM

## 2020-03-10 DIAGNOSIS — Z98.891 HISTORY OF UTERINE SCAR FROM PREVIOUS SURGERY: Chronic | ICD-10-CM

## 2020-03-10 DIAGNOSIS — M17.11 UNILATERAL PRIMARY OSTEOARTHRITIS, RIGHT KNEE: ICD-10-CM

## 2020-03-10 LAB
ANION GAP SERPL CALC-SCNC: 5 MMOL/L — SIGNIFICANT CHANGE UP (ref 5–17)
BUN SERPL-MCNC: 17 MG/DL — SIGNIFICANT CHANGE UP (ref 7–23)
CALCIUM SERPL-MCNC: 9.4 MG/DL — SIGNIFICANT CHANGE UP (ref 8.5–10.1)
CHLORIDE SERPL-SCNC: 107 MMOL/L — SIGNIFICANT CHANGE UP (ref 96–108)
CO2 SERPL-SCNC: 27 MMOL/L — SIGNIFICANT CHANGE UP (ref 22–31)
CREAT SERPL-MCNC: 0.77 MG/DL — SIGNIFICANT CHANGE UP (ref 0.5–1.3)
GLUCOSE SERPL-MCNC: 132 MG/DL — HIGH (ref 70–99)
HCT VFR BLD CALC: 32.2 % — LOW (ref 34.5–45)
HGB BLD-MCNC: 11.4 G/DL — LOW (ref 11.5–15.5)
MCHC RBC-ENTMCNC: 32.5 PG — SIGNIFICANT CHANGE UP (ref 27–34)
MCHC RBC-ENTMCNC: 35.4 GM/DL — SIGNIFICANT CHANGE UP (ref 32–36)
MCV RBC AUTO: 91.7 FL — SIGNIFICANT CHANGE UP (ref 80–100)
PLATELET # BLD AUTO: 324 K/UL — SIGNIFICANT CHANGE UP (ref 150–400)
POTASSIUM SERPL-MCNC: 4.8 MMOL/L — SIGNIFICANT CHANGE UP (ref 3.5–5.3)
POTASSIUM SERPL-SCNC: 4.8 MMOL/L — SIGNIFICANT CHANGE UP (ref 3.5–5.3)
RBC # BLD: 3.51 M/UL — LOW (ref 3.8–5.2)
RBC # FLD: 13.1 % — SIGNIFICANT CHANGE UP (ref 10.3–14.5)
SODIUM SERPL-SCNC: 139 MMOL/L — SIGNIFICANT CHANGE UP (ref 135–145)
WBC # BLD: 9.25 K/UL — SIGNIFICANT CHANGE UP (ref 3.8–10.5)
WBC # FLD AUTO: 9.25 K/UL — SIGNIFICANT CHANGE UP (ref 3.8–10.5)

## 2020-03-10 PROCEDURE — 20985 CPTR-ASST DIR MS PX: CPT | Mod: AS

## 2020-03-10 PROCEDURE — C1713: CPT

## 2020-03-10 PROCEDURE — 88305 TISSUE EXAM BY PATHOLOGIST: CPT | Mod: 26

## 2020-03-10 PROCEDURE — C1776: CPT

## 2020-03-10 PROCEDURE — 94640 AIRWAY INHALATION TREATMENT: CPT

## 2020-03-10 PROCEDURE — 80048 BASIC METABOLIC PNL TOTAL CA: CPT

## 2020-03-10 PROCEDURE — 99223 1ST HOSP IP/OBS HIGH 75: CPT

## 2020-03-10 PROCEDURE — 36415 COLL VENOUS BLD VENIPUNCTURE: CPT

## 2020-03-10 PROCEDURE — 97530 THERAPEUTIC ACTIVITIES: CPT | Mod: GP

## 2020-03-10 PROCEDURE — 27447 TOTAL KNEE ARTHROPLASTY: CPT | Mod: AS

## 2020-03-10 PROCEDURE — 97162 PT EVAL MOD COMPLEX 30 MIN: CPT | Mod: GP

## 2020-03-10 PROCEDURE — 73560 X-RAY EXAM OF KNEE 1 OR 2: CPT | Mod: RT

## 2020-03-10 PROCEDURE — 73560 X-RAY EXAM OF KNEE 1 OR 2: CPT | Mod: 26,RT

## 2020-03-10 PROCEDURE — 85027 COMPLETE CBC AUTOMATED: CPT

## 2020-03-10 PROCEDURE — 97116 GAIT TRAINING THERAPY: CPT | Mod: GP

## 2020-03-10 PROCEDURE — 88305 TISSUE EXAM BY PATHOLOGIST: CPT

## 2020-03-10 RX ORDER — OXYCODONE HYDROCHLORIDE 5 MG/1
5 TABLET ORAL EVERY 4 HOURS
Refills: 0 | Status: DISCONTINUED | OUTPATIENT
Start: 2020-03-10 | End: 2020-03-12

## 2020-03-10 RX ORDER — DEXAMETHASONE 0.5 MG/5ML
8 ELIXIR ORAL ONCE
Refills: 0 | Status: COMPLETED | OUTPATIENT
Start: 2020-03-11 | End: 2020-03-11

## 2020-03-10 RX ORDER — VANCOMYCIN HCL 1 G
1000 VIAL (EA) INTRAVENOUS EVERY 12 HOURS
Refills: 0 | Status: DISCONTINUED | OUTPATIENT
Start: 2020-03-10 | End: 2020-03-11

## 2020-03-10 RX ORDER — PYRIDOXINE HCL (VITAMIN B6) 100 MG
1 TABLET ORAL
Qty: 0 | Refills: 0 | DISCHARGE

## 2020-03-10 RX ORDER — SODIUM CHLORIDE 9 MG/ML
1000 INJECTION, SOLUTION INTRAVENOUS
Refills: 0 | Status: DISCONTINUED | OUTPATIENT
Start: 2020-03-10 | End: 2020-03-12

## 2020-03-10 RX ORDER — FOLIC ACID 0.8 MG
1 TABLET ORAL DAILY
Refills: 0 | Status: DISCONTINUED | OUTPATIENT
Start: 2020-03-10 | End: 2020-03-12

## 2020-03-10 RX ORDER — OXYCODONE HYDROCHLORIDE 5 MG/1
10 TABLET ORAL EVERY 4 HOURS
Refills: 0 | Status: DISCONTINUED | OUTPATIENT
Start: 2020-03-10 | End: 2020-03-12

## 2020-03-10 RX ORDER — SENNA PLUS 8.6 MG/1
2 TABLET ORAL AT BEDTIME
Refills: 0 | Status: DISCONTINUED | OUTPATIENT
Start: 2020-03-10 | End: 2020-03-12

## 2020-03-10 RX ORDER — HYDROMORPHONE HYDROCHLORIDE 2 MG/ML
0.5 INJECTION INTRAMUSCULAR; INTRAVENOUS; SUBCUTANEOUS EVERY 4 HOURS
Refills: 0 | Status: DISCONTINUED | OUTPATIENT
Start: 2020-03-10 | End: 2020-03-12

## 2020-03-10 RX ORDER — FLUTICASONE PROPIONATE 50 MCG
1 SPRAY, SUSPENSION NASAL DAILY
Refills: 0 | Status: DISCONTINUED | OUTPATIENT
Start: 2020-03-10 | End: 2020-03-12

## 2020-03-10 RX ORDER — TIZANIDINE 4 MG/1
2 TABLET ORAL
Qty: 0 | Refills: 0 | DISCHARGE

## 2020-03-10 RX ORDER — RIVAROXABAN 15 MG-20MG
10 KIT ORAL DAILY
Refills: 0 | Status: DISCONTINUED | OUTPATIENT
Start: 2020-03-10 | End: 2020-03-12

## 2020-03-10 RX ORDER — IPRATROPIUM BROMIDE 21 MCG
2 AEROSOL, SPRAY (ML) NASAL
Qty: 0 | Refills: 0 | DISCHARGE

## 2020-03-10 RX ORDER — GABAPENTIN 400 MG/1
300 CAPSULE ORAL DAILY
Refills: 0 | Status: DISCONTINUED | OUTPATIENT
Start: 2020-03-10 | End: 2020-03-12

## 2020-03-10 RX ORDER — PANTOPRAZOLE SODIUM 20 MG/1
40 TABLET, DELAYED RELEASE ORAL
Refills: 0 | Status: DISCONTINUED | OUTPATIENT
Start: 2020-03-10 | End: 2020-03-12

## 2020-03-10 RX ORDER — MONTELUKAST 4 MG/1
10 TABLET, CHEWABLE ORAL AT BEDTIME
Refills: 0 | Status: DISCONTINUED | OUTPATIENT
Start: 2020-03-10 | End: 2020-03-12

## 2020-03-10 RX ORDER — ACETAMINOPHEN 500 MG
1000 TABLET ORAL ONCE
Refills: 0 | Status: COMPLETED | OUTPATIENT
Start: 2020-03-10 | End: 2020-03-10

## 2020-03-10 RX ORDER — CLARITHROMYCIN 500 MG
1 TABLET ORAL
Qty: 0 | Refills: 0 | DISCHARGE

## 2020-03-10 RX ORDER — LEVOTHYROXINE SODIUM 125 MCG
50 TABLET ORAL DAILY
Refills: 0 | Status: DISCONTINUED | OUTPATIENT
Start: 2020-03-10 | End: 2020-03-12

## 2020-03-10 RX ORDER — AZELASTINE HCL 0.05 %
1 DROPS OPHTHALMIC (EYE)
Qty: 0 | Refills: 0 | DISCHARGE

## 2020-03-10 RX ORDER — ASCORBIC ACID 60 MG
500 TABLET,CHEWABLE ORAL
Refills: 0 | Status: DISCONTINUED | OUTPATIENT
Start: 2020-03-10 | End: 2020-03-12

## 2020-03-10 RX ORDER — DOCUSATE SODIUM 100 MG
1 CAPSULE ORAL
Qty: 0 | Refills: 0 | DISCHARGE

## 2020-03-10 RX ORDER — NYSTATIN/TRIAMCINOLONE ACET
0 OINTMENT (GRAM) TOPICAL
Qty: 0 | Refills: 0 | DISCHARGE

## 2020-03-10 RX ORDER — MONTELUKAST 4 MG/1
10 TABLET, CHEWABLE ORAL DAILY
Refills: 0 | Status: DISCONTINUED | OUTPATIENT
Start: 2020-03-10 | End: 2020-03-10

## 2020-03-10 RX ORDER — RIVAROXABAN 15 MG-20MG
1 KIT ORAL
Qty: 10 | Refills: 0
Start: 2020-03-10 | End: 2020-03-19

## 2020-03-10 RX ORDER — POLYETHYLENE GLYCOL 3350 17 G/17G
17 POWDER, FOR SOLUTION ORAL DAILY
Refills: 0 | Status: DISCONTINUED | OUTPATIENT
Start: 2020-03-10 | End: 2020-03-12

## 2020-03-10 RX ORDER — POLYETHYLENE GLYCOL 3350 17 G/17G
0 POWDER, FOR SOLUTION ORAL
Qty: 0 | Refills: 0 | DISCHARGE

## 2020-03-10 RX ORDER — EZETIMIBE 10 MG/1
1 TABLET ORAL
Qty: 0 | Refills: 0 | DISCHARGE

## 2020-03-10 RX ORDER — ONDANSETRON 8 MG/1
4 TABLET, FILM COATED ORAL EVERY 6 HOURS
Refills: 0 | Status: DISCONTINUED | OUTPATIENT
Start: 2020-03-10 | End: 2020-03-12

## 2020-03-10 RX ORDER — ACETAMINOPHEN 500 MG
975 TABLET ORAL EVERY 8 HOURS
Refills: 0 | Status: DISCONTINUED | OUTPATIENT
Start: 2020-03-10 | End: 2020-03-12

## 2020-03-10 RX ORDER — DICLOFENAC SODIUM 30 MG/G
0 GEL TOPICAL
Qty: 0 | Refills: 0 | DISCHARGE

## 2020-03-10 RX ORDER — FLUTICASONE PROPIONATE 50 MCG
1 SPRAY, SUSPENSION NASAL
Qty: 0 | Refills: 0 | DISCHARGE

## 2020-03-10 RX ORDER — OMEGA-3 ACID ETHYL ESTERS 1 G
0 CAPSULE ORAL
Qty: 0 | Refills: 0 | DISCHARGE

## 2020-03-10 RX ORDER — GABAPENTIN 400 MG/1
300 CAPSULE ORAL
Qty: 0 | Refills: 0 | DISCHARGE

## 2020-03-10 RX ORDER — GABAPENTIN 400 MG/1
0 CAPSULE ORAL
Qty: 0 | Refills: 0 | DISCHARGE

## 2020-03-10 RX ADMIN — FENTANYL CITRATE 50 MICROGRAM(S): 50 INJECTION INTRAVENOUS at 14:15

## 2020-03-10 RX ADMIN — FENTANYL CITRATE 50 MICROGRAM(S): 50 INJECTION INTRAVENOUS at 14:04

## 2020-03-10 RX ADMIN — FENTANYL CITRATE 50 MICROGRAM(S): 50 INJECTION INTRAVENOUS at 13:15

## 2020-03-10 RX ADMIN — SODIUM CHLORIDE 3 MILLILITER(S): 9 INJECTION INTRAMUSCULAR; INTRAVENOUS; SUBCUTANEOUS at 21:00

## 2020-03-10 RX ADMIN — OXYCODONE HYDROCHLORIDE 10 MILLIGRAM(S): 5 TABLET ORAL at 23:10

## 2020-03-10 RX ADMIN — OXYCODONE HYDROCHLORIDE 5 MILLIGRAM(S): 5 TABLET ORAL at 15:09

## 2020-03-10 RX ADMIN — Medication 400 MILLIGRAM(S): at 17:18

## 2020-03-10 RX ADMIN — SODIUM CHLORIDE 75 MILLILITER(S): 9 INJECTION, SOLUTION INTRAVENOUS at 15:26

## 2020-03-10 RX ADMIN — FENTANYL CITRATE 50 MICROGRAM(S): 50 INJECTION INTRAVENOUS at 13:10

## 2020-03-10 RX ADMIN — MONTELUKAST 10 MILLIGRAM(S): 4 TABLET, CHEWABLE ORAL at 20:59

## 2020-03-10 RX ADMIN — Medication 500 MILLIGRAM(S): at 17:18

## 2020-03-10 RX ADMIN — OXYCODONE HYDROCHLORIDE 5 MILLIGRAM(S): 5 TABLET ORAL at 15:24

## 2020-03-10 RX ADMIN — OXYCODONE HYDROCHLORIDE 10 MILLIGRAM(S): 5 TABLET ORAL at 22:15

## 2020-03-10 RX ADMIN — Medication 1000 MILLIGRAM(S): at 17:30

## 2020-03-10 RX ADMIN — Medication 975 MILLIGRAM(S): at 21:00

## 2020-03-10 RX ADMIN — Medication 975 MILLIGRAM(S): at 21:01

## 2020-03-10 RX ADMIN — Medication 250 MILLIGRAM(S): at 22:15

## 2020-03-10 RX ADMIN — RIVAROXABAN 10 MILLIGRAM(S): KIT at 20:59

## 2020-03-10 NOTE — PHYSICAL THERAPY INITIAL EVALUATION ADULT - PLANNED THERAPY INTERVENTIONS, PT EVAL
gait training/transfer training/bed mobility training/today: therapeutic exercises x 10 min, gait training x 15 min

## 2020-03-10 NOTE — PROGRESS NOTE ADULT - SUBJECTIVE AND OBJECTIVE BOX
Orthopedics Post-op Check    Patient seen and examined at bedside, resting comfortably. Pain adequately controlled. Patient feeling well. No nausea or vomiting. No other acute complaints at this time    Vital Signs Last 24 Hrs  T(C): 36.7 (03-10-20 @ 16:58), Max: 37.2 (03-10-20 @ 09:17)  T(F): 98.1 (03-10-20 @ 16:58), Max: 98.9 (03-10-20 @ 09:17)  HR: 94 (03-10-20 @ 16:58) (74 - 94)  BP: 141/83 (03-10-20 @ 16:58) (112/54 - 161/80)  BP(mean): --  RR: 16 (03-10-20 @ 16:58) (12 - 20)  SpO2: 98% (03-10-20 @ 16:58) (95% - 100%)                        11.4   9.25  )-----------( 324      ( 10 Mar 2020 13:14 )             32.2     10 Mar 2020 13:14    139    |  107    |  17     ----------------------------<  132    4.8     |  27     |  0.77     Ca    9.4        10 Mar 2020 13:14      Exam:  Gen: NAD, Awake and alert  RLE  Dressing clean and dry  +EHL/FHL/TA/GS  SILT L2-S1  +DP  Calf Soft NT  Compartments soft and compressible

## 2020-03-10 NOTE — CONSULT NOTE ADULT - ASSESSMENT
Patient is a 69y old  Female who presents with a chief complaint of R Knee pain Now on 2 North s/p Right TKA (10 Mar 2020 10:29)  Consulted by     for VTE prophylaxis, risk stratification, and anticoagulation management. Pt is VTE risk due to surgery and restricted mobility. Pt is low bleeding risk.    Plan  D/W pt use of Xarelto, risks vs benefits and pt is in agreement to use Xarelto    Start Xarelto 10 mg po tonight and cont x 12 days (tentatively till 3/22/2020)  Protonix 40 mg po daily while on Xarelto  Daily CBC, BMP  BLE venodynes  INC mobility as kenisha    Thank you for the Consult, will follow

## 2020-03-10 NOTE — PROGRESS NOTE ADULT - ASSESSMENT
A/P:  Patient is a 69y Female s/p R TKA Stable POD 0      -Ice/Elevate  -Incentive Spirometry  -Multimodal Analgesia  -Ppx ABX  -DVT PE ppx  -SCDs  -PT/OT OOB  -WBAT  -Discharge planning - pending PT eval  -Will discuss w/ attending and advise if plan changes

## 2020-03-10 NOTE — BRIEF OPERATIVE NOTE - NSICDXBRIEFPROCEDURE_GEN_ALL_CORE_FT
With iron deficiency  Hemoglobin 10.6 no clinical signs of bleeding continue to monitor   PROCEDURES:  Right total knee arthroplasty 10-Mar-2020 12:26:19  Rhonda Oden

## 2020-03-10 NOTE — PHYSICAL THERAPY INITIAL EVALUATION ADULT - ACTIVE RANGE OF MOTION EXAMINATION, REHAB EVAL
R knee 30-90*, full PROM/bilateral  lower extremity Active ROM was WFL (within functional limits)/bilateral upper extremity Active ROM was WFL (within functional limits)

## 2020-03-10 NOTE — DISCHARGE NOTE PROVIDER - CARE PROVIDER_API CALL
Kishan Valdes)  Orthopaedic Surgery  [address] [place] [address] [place] [address]  Phone: [phone]  Fax: [phone]  Follow Up Time:

## 2020-03-10 NOTE — CONSULT NOTE ADULT - SUBJECTIVE AND OBJECTIVE BOX
HPI:      Patient is a 69y old  Female who presents with a chief complaint of R Knee pain Now on 2 North Right TKA (10 Mar 2020 10:29)      Consulted by     for VTE prophylaxis, risk stratification, and anticoagulation management.    PAST MEDICAL & SURGICAL HISTORY:  Back pain  Acute sinusitis  Constipation  Paraesophageal hiatal hernia: repair done  Heart murmur  Psoriasis  Bronchial asthma: recent broncohitis;  never intubated  Cataract, left  Hypothyroidism  Environmental and seasonal allergies  HLD (hyperlipidemia)  OA (osteoarthritis)  History of breast surgery: excision of papilloma right breast x 2  H/O shoulder surgery: left due to fracture  H/O: :   S/P repair of paraesophageal hernia          CrCl:69.4  EBL:150  BMI:27.8    Caprini VTE Risk Score:CAPRINI SCORE  AGE RELATED RISK FACTORS                                                       MOBILITY RELATED FACTORS  [ ] Age 41-60 years                                            (1 Point)                  [ x] Bed rest /restricted mobility                             (1 Point)  [x ] Age: 61-74 years                                           (2 Points)                [ ] Plaster cast                                                   (2 Points)  [ ] Age= 75 years                                              (3 Points)                 [ ] Bed bound for more than 72 hours                   (2 Points)    DISEASE RELATED RISK FACTORS                                               GENDER SPECIFIC FACTORS  [ ] Edema in the lower extremities                       (1 Point)           [ ] Pregnancy                                                            (1 Point)  [ ] Varicose veins                                               (1 Point)                  [ ] Post-partum < 6 weeks                                      (1 Point)             [x ] BMI > 25 Kg/m2                                            (1 Point)                  [ ] Hormonal therapy or oral contraception       (1 Point)                 [ ] Sepsis (in the previous month)                        (1 Point)             [ ] History of pregnancy complications                (1Point)  [ ] Pneumonia or serious lung disease                                             [ ] Unexplained or recurrent  (=/>3), premature                                 (In the previous month)                               (1 Point)                birth with toxemia or growth-restricted infant (1 Point)  [ ] Abnormal pulmonary function test            (1 Point)                                   SURGERY RELATED RISK FACTORS  [ ] Acute myocardial infarction                       (1 Point)                  [ ]  Section                                         (1 Point)  [ ] Congestive heart failure (in the previous month) (1 Point)   [ ] Minor surgery   lasting <45 minutes       (1 Point)   [ ] Inflammatory bowel disease                             (1 Point)          [ ] Arthroscopic surgery                                  (2 Points)  [ ] Central venous access                                    (2 Points)            [ ] General surgery lasting >45 minutes      (2 Points)       [ ] Stroke (in the previous month)                  (5 Points)            [x ] Elective major lower extremity arthroplasty (5 Points)                                   [  ] Malignancy (present or past include skin melanoma                                          but exclude  basal skin cell)    (2 points)                                      TRAUMA RELATED RISK FACTORS                HEMATOLOGY RELATED FACTORS                                  [ ] Fracture of the hip, pelvis, or leg                       (5 Points)  [ ] Prior episodes of VTE                                     (3 Points)          [ ] Acute spinal cord injury (in the previous month)  (5 Points)  [ ] Positive family history for VTE                         (3 Points)       [ ] Paralysis (less than 1 month)                          (5 Points)  [ ] Prothrombin 01101 A                                      (3 Points)         [ ] Multiple Trauma (within 1month)                 (5Points)                                                                                                                                                                [ ] Factor V Leiden                                          (3 Points)                                OTHER RISK FACTORS                          [ ] Lupus anticoagulants                                     (3 Points)                       [ ] BMI > 40                          (1 Point)                                                         [ ] Anticardiolipin antibodies                                (3 Points)                   [ ] Smoking                              (1Point)                                                [ ] High homocysteine in the blood                      (3 Points)                [  ] Diabetes requiring insulin (1point)                         [ ] Other congenital or acquired thrombophilia       (3 Points)          [  ] Chemotherapy                   (1 Point)  [ ] Heparin induced thrombocytopenia                  (3 Points)             [  ] Blood Transfusion                (1 point)                                                                                                             Total Score [  9       ]                                                                                                                                                                                                                                                                                                                                                                                                                                         IMPROVE Bleeding Risk Score:1.5    Torniq time:     Time In:   Time Out:     Falls Risk:   High (  )  Mod (  )  Low (  )      FAMILY HISTORY:  FH: hyperlipidemia: mother and father  FH: hypertension: mother  FH: colon cancer: mother    Denies any personal or familial history of clotting or bleeding disorders.    Allergies    Celebrex (Other)  codeine (Angioedema)  Keflex (Unknown)  penicillins (Unknown)  sulfa drugs (Unknown)    Intolerances        REVIEW OF SYSTEMS    (  )Fever	     (  )Constipation	(  )SOB				(  )Headache	(  )Dysuria  (  )Chills	     (  )Melena	(  )Dyspnea present on exertion	                    (  )Dizziness                    (  )Polyuria  (  )Nausea	     (  )Hematochezia	(  )Cough			                    (  )Syncope   	(  )Hematuria  (  )Vomiting    (  )Chest Pain	(  )Wheezing			(  )Weakness  (  )Diarrhea     (  )Palpitations	(  )Anorexia			( x )joint pain    All  other review of systems negative: Yes    Unable to obtain review of systems due to:      PHYSICAL EXAM:    Constitutional: Appears Well    Neurological: A& O x 3    Skin: Warm    Respiratory and Thorax: normal effort; Breath sounds: normal; No rales/wheezing/rhonchi  	  Cardiovascular: S1, S2, regular, NMBR	    Gastrointestinal: BS + x 4Q, nontender	    Genitourinary:  Bladder nondistended, nontender    Musculoskeletal:   General Right:   no muscle/joint tenderness,   normal tone, no joint swelling,   ROM: limited/full	    General Left:   no muscle/joint tenderness,   normal tone, no joint swelling,   ROM: limited/full    Hip:  Right: Dressing CDI;            Left: Dressing CDI;       Knee:  Right: Incision: ; Dressing CDI;                 Left: Incision: ; Dressing CDI;     Shoulder:  Rt: Drsing CDI; Sling/immob. noted; Cap refill good; Radial pulse +; Sensation intact                     Lt: Drsing CDI; Sling/immob. noted; Cap refill good; Radial pulse +; Sensation intact    Lower extrems:   Right: no calf tenderness              negative lindsay's sign               + pedal pulses    Left:   no calf tenderness              negative lindsay's sign               + pedal pulses                          11.4   9.25  )-----------( 324      ( 10 Mar 2020 13:14 )             32.2       03-10    139  |  107  |  17  ----------------------------<  132<H>  4.8   |  27  |  0.77    Ca    9.4      10 Mar 2020 13:14        				    MEDICATIONS  (STANDING):  acetaminophen   Tablet .. 975 milliGRAM(s) Oral every 8 hours  acetaminophen   Tablet .. 975 milliGRAM(s) Oral once  ascorbic acid 500 milliGRAM(s) Oral two times a day  benzonatate 100 milliGRAM(s) Oral three times a day  famotidine    Tablet 20 milliGRAM(s) Oral once  fluticasone propionate (50 MICROgram(s)/actuation) Nasal Spray - Peds 1 Spray(s) Alternating Nostrils daily  folic acid 1 milliGRAM(s) Oral daily  gabapentin 300 milliGRAM(s) Oral daily  lactated ringers. 1000 milliLiter(s) IV Continuous <Continuous>  levothyroxine 50 MICROGram(s) Oral daily  montelukast 10 milliGRAM(s) Oral daily  multivitamin 1 Tablet(s) Oral daily  pantoprazole    Tablet 40 milliGRAM(s) Oral once  pantoprazole    Tablet 40 milliGRAM(s) Oral before breakfast  polyethylene glycol 3350 17 Gram(s) Oral daily  polyvinyl alcohol 1.4%/povidone 0.6% Ophthalmic Solution - Peds 1 Drop(s) Both EYES four times a day  sodium chloride 0.9% lock flush 3 milliLiter(s) IV Push every 8 hours  terbutaline 10 milliGRAM(s) Oral two times a day  vancomycin  IVPB 1000 milliGRAM(s) IV Intermittent every 12 hours          DVT Prophylaxis:  LMWH                   (  )  Heparin SQ           (  )  Coumadin             (  )  Xarelto                  (  )  Eliquis                   (  )  Venodynes           (  )  Ambulation          (  )  UFH                       (  )  Contraindicated  (  )  EC ASPIRIN       (  ) HPI:      Patient is a 69y old  Female who presents with a chief complaint of R Knee pain Now on 2 North Right TKA (10 Mar 2020 10:29)      Consulted by     for VTE prophylaxis, risk stratification, and anticoagulation management.    PAST MEDICAL & SURGICAL HISTORY:  Back pain  Acute sinusitis  Constipation  Paraesophageal hiatal hernia: repair done  Heart murmur  Psoriasis  Bronchial asthma: recent broncohitis;  never intubated  Cataract, left  Hypothyroidism  Environmental and seasonal allergies  HLD (hyperlipidemia)  OA (osteoarthritis)  History of breast surgery: excision of papilloma right breast x 2  H/O shoulder surgery: left due to fracture  H/O: :   S/P repair of paraesophageal hernia          CrCl:69.4  EBL:150  BMI:27.8    Caprini VTE Risk Score:CAPRINI SCORE  AGE RELATED RISK FACTORS                                                       MOBILITY RELATED FACTORS  [ ] Age 41-60 years                                            (1 Point)                  [ x] Bed rest /restricted mobility                             (1 Point)  [x ] Age: 61-74 years                                           (2 Points)                [ ] Plaster cast                                                   (2 Points)  [ ] Age= 75 years                                              (3 Points)                 [ ] Bed bound for more than 72 hours                   (2 Points)    DISEASE RELATED RISK FACTORS                                               GENDER SPECIFIC FACTORS  [ ] Edema in the lower extremities                       (1 Point)           [ ] Pregnancy                                                            (1 Point)  [ ] Varicose veins                                               (1 Point)                  [ ] Post-partum < 6 weeks                                      (1 Point)             [x ] BMI > 25 Kg/m2                                            (1 Point)                  [ ] Hormonal therapy or oral contraception       (1 Point)                 [ ] Sepsis (in the previous month)                        (1 Point)             [ ] History of pregnancy complications                (1Point)  [ ] Pneumonia or serious lung disease                                             [ ] Unexplained or recurrent  (=/>3), premature                                 (In the previous month)                               (1 Point)                birth with toxemia or growth-restricted infant (1 Point)  [ ] Abnormal pulmonary function test            (1 Point)                                   SURGERY RELATED RISK FACTORS  [ ] Acute myocardial infarction                       (1 Point)                  [ ]  Section                                         (1 Point)  [ ] Congestive heart failure (in the previous month) (1 Point)   [ ] Minor surgery   lasting <45 minutes       (1 Point)   [ ] Inflammatory bowel disease                             (1 Point)          [ ] Arthroscopic surgery                                  (2 Points)  [ ] Central venous access                                    (2 Points)            [ ] General surgery lasting >45 minutes      (2 Points)       [ ] Stroke (in the previous month)                  (5 Points)            [x ] Elective major lower extremity arthroplasty (5 Points)                                   [  ] Malignancy (present or past include skin melanoma                                          but exclude  basal skin cell)    (2 points)                                      TRAUMA RELATED RISK FACTORS                HEMATOLOGY RELATED FACTORS                                  [ ] Fracture of the hip, pelvis, or leg                       (5 Points)  [ ] Prior episodes of VTE                                     (3 Points)          [ ] Acute spinal cord injury (in the previous month)  (5 Points)  [ ] Positive family history for VTE                         (3 Points)       [ ] Paralysis (less than 1 month)                          (5 Points)  [ ] Prothrombin 61429 A                                      (3 Points)         [ ] Multiple Trauma (within 1month)                 (5Points)                                                                                                                                                                [ ] Factor V Leiden                                          (3 Points)                                OTHER RISK FACTORS                          [ ] Lupus anticoagulants                                     (3 Points)                       [ ] BMI > 40                          (1 Point)                                                         [ ] Anticardiolipin antibodies                                (3 Points)                   [ ] Smoking                              (1Point)                                                [ ] High homocysteine in the blood                      (3 Points)                [  ] Diabetes requiring insulin (1point)                         [ ] Other congenital or acquired thrombophilia       (3 Points)          [  ] Chemotherapy                   (1 Point)  [ ] Heparin induced thrombocytopenia                  (3 Points)             [  ] Blood Transfusion                (1 point)                                                                                                             Total Score [  9       ]                                                                                                                                                                                                                                                                                                                                                                                                                                         IMPROVE Bleeding Risk Score:1.5    Torniq time:     Time In: 11:18      Falls Risk:   High (  )  Mod ( x )  Low (  )      FAMILY HISTORY:  FH: hyperlipidemia: mother and father  FH: hypertension: mother  FH: colon cancer: mother    Denies any personal or familial history of clotting or bleeding disorders.    Allergies    Celebrex (Other)  codeine (Angioedema)  Keflex (Unknown)  penicillins (Unknown)  sulfa drugs (Unknown)    Intolerances        REVIEW OF SYSTEMS    (  )Fever	     (  )Constipation	(  )SOB				(  )Headache	(  )Dysuria  (  )Chills	     (  )Melena	(  )Dyspnea present on exertion	                    (  )Dizziness                    (  )Polyuria  (  )Nausea	     (  )Hematochezia	(  )Cough			                    (  )Syncope   	(  )Hematuria  (  )Vomiting    (  )Chest Pain	(  )Wheezing			(  )Weakness  (  )Diarrhea     (  )Palpitations	(  )Anorexia			( x )joint pain    All  other review of systems negative: Yes    Vital Signs Last 24 Hrs  T(C): 36.3 (10 Mar 2020 15:30), Max: 37.2 (10 Mar 2020 09:17)  T(F): 97.4 (10 Mar 2020 15:30), Max: 98.9 (10 Mar 2020 09:17)  HR: 92 (10 Mar 2020 15:30) (74 - 93)  BP: 146/62 (10 Mar 2020 15:30) (112/54 - 161/80)  BP(mean): --  RR: 16 (10 Mar 2020 15:30) (12 - 20)  SpO2: 97% (10 Mar 2020 15:30) (95% - 100%)      PHYSICAL EXAM:    Constitutional: Appears Well    Neurological: A& O x 3    Skin: Warm    Respiratory and Thorax: normal effort; Breath sounds: normal; No rales/wheezing/rhonchi  	  Cardiovascular: S1, S2, regular, NMBR	    Gastrointestinal: BS + x 4Q, nontender	    Genitourinary:  Bladder nondistended, nontender    Musculoskeletal:   General Right:   + muscle/joint tenderness,   normal tone, no joint swelling,   ROM: limited	    General Left:   no muscle/joint tenderness,   normal tone, no joint swelling,   ROM: full      Knee:  Right: Incision: ; Dressing CDI;                   Lower extrems:   Right: no calf tenderness              negative lindsay's sign               + pedal pulses    Left:   no calf tenderness              negative lindsay's sign               + pedal pulses                          11.4   9.25  )-----------( 324      ( 10 Mar 2020 13:14 )             32.2       03-10    139  |  107  |  17  ----------------------------<  132<H>  4.8   |  27  |  0.77    Ca    9.4      10 Mar 2020 13:14        MEDICATIONS  (STANDING):  acetaminophen   Tablet .. 975 milliGRAM(s) Oral every 8 hours  acetaminophen   Tablet .. 975 milliGRAM(s) Oral once  ascorbic acid 500 milliGRAM(s) Oral two times a day  benzonatate 100 milliGRAM(s) Oral three times a day  famotidine    Tablet 20 milliGRAM(s) Oral once  fluticasone propionate (50 MICROgram(s)/actuation) Nasal Spray - Peds 1 Spray(s) Alternating Nostrils daily  folic acid 1 milliGRAM(s) Oral daily  gabapentin 300 milliGRAM(s) Oral daily  lactated ringers. 1000 milliLiter(s) (75 mL/Hr) IV Continuous <Continuous>  levothyroxine 50 MICROGram(s) Oral daily  montelukast 10 milliGRAM(s) Oral daily  multivitamin 1 Tablet(s) Oral daily  pantoprazole    Tablet 40 milliGRAM(s) Oral once  pantoprazole    Tablet 40 milliGRAM(s) Oral before breakfast  polyethylene glycol 3350 17 Gram(s) Oral daily  polyvinyl alcohol 1.4%/povidone 0.6% Ophthalmic Solution - Peds 1 Drop(s) Both EYES four times a day  rivaroxaban 10 milliGRAM(s) Oral daily  sodium chloride 0.9% lock flush 3 milliLiter(s) IV Push every 8 hours  terbutaline 10 milliGRAM(s) Oral two times a day  vancomycin  IVPB 1000 milliGRAM(s) IV Intermittent every 12 hours          DVT Prophylaxis:  LMWH                   (  )  Heparin SQ           (  )  Coumadin             (  )  Xarelto                  ( x )  Eliquis                   (  )  Venodynes           (x  )  Ambulation          ( x )  UFH                       (  )  Contraindicated  (  )  EC ASPIRIN       (  )

## 2020-03-10 NOTE — PHYSICAL THERAPY INITIAL EVALUATION ADULT - GENERAL OBSERVATIONS, REHAB EVAL
Patient received in bed on ortho floor, son and sister at bedside. +B SCDs, +IV, + R LE wrapped in ace. Patient c/o pain at 3/10 at rest, 6/10 post session.  refused ice, RN informed patient asking for Tylenol.

## 2020-03-10 NOTE — DISCHARGE NOTE PROVIDER - HOSPITAL COURSE
H&P:    Pt is a69y Female PAST MEDICAL & SURGICAL HISTORY:    Back pain    Acute sinusitis    Constipation    Paraesophageal hiatal hernia: repair done    Heart murmur    Psoriasis    Bronchial asthma: recent broncohitis;  never intubated    Cataract, left    Hypothyroidism    Environmental and seasonal allergies    HLD (hyperlipidemia)    OA (osteoarthritis)    History of breast surgery: excision of papilloma right breast x 2    H/O shoulder surgery: left due to fracture    H/O: :     S/P repair of paraesophageal hernia             Now s/p Total Knee Arthroplasty. Pt is afebrile with stable vital signs. Pain is controlled. Alert and Oriented. Exam reveals intact EHL FHL TA GS, +DP. Dressing is clean and dry with a new bandage on.        Hospital Course:    Patient presented to [facility] medically cleared for elective Knee Replacement Surgery, having failed outpatient conservative management. Prophylactic antibiotics were started before the procedure and continued for 24 hours. They were admitted after surgery to the orthopedic floor.   There were no complications during the hospital stay. All home medications were continued.         Routine consults were obtained from the Anticoagulation Team for DVT/PE prophylaxis, from Physical Therapy for twice daily PT, and from the Hospitalist for Medical Co-management. Patient was placed on anticoagulation.  Pertinent home medications were continued.  Daily labs were followed.          On POD 0 pt was stable overnight. No major event POD1-2. Pt received twice daily PT and a new dressing was applied prior to discharge. The plan is for DC to home with home PT** or to Rehab for ongoing PT**.  The orthopedic Attending is aware and agrees. H&P:    Pt is a69y Female     PAST MEDICAL & SURGICAL HISTORY:    Back pain    Acute sinusitis    Constipation    Paraesophageal hiatal hernia: repair done    Heart murmur    Psoriasis    Bronchial asthma: recent broncohitis;  never intubated    Cataract, left    Hypothyroidism    Environmental and seasonal allergies    HLD (hyperlipidemia)    OA (osteoarthritis)    History of breast surgery: excision of papilloma right breast x 2    H/O shoulder surgery: left due to fracture    H/O: :     S/P repair of paraesophageal hernia             Now s/p Right Total Knee Arthroplasty. Pt is afebrile with stable vital signs. Pain is controlled. Alert and Oriented. Exam reveals intact EHL FHL TA GS, +DP. Dressing is clean and dry with a new bandage on.        Hospital Course:    Patient presented to [facility] medically cleared for elective Right Knee Replacement Surgery, having failed outpatient conservative management. Prophylactic antibiotics were started before the procedure and continued for 24 hours. They were admitted after surgery to the orthopedic floor.   There were no complications during the hospital stay. All home medications were continued.         Routine consults were obtained from the Anticoagulation Team for DVT/PE prophylaxis, from Physical Therapy for twice daily PT, and from the Hospitalist for Medical Co-management. Patient was placed on anticoagulation.  Pertinent home medications were continued.  Daily labs were followed.          On POD 0 pt was stable overnight. No major event POD1-2. Pt received twice daily PT and a new dressing was applied prior to discharge. The plan is for DC to home with home PT.  The orthopedic Attending is aware and agrees.

## 2020-03-10 NOTE — DISCHARGE NOTE PROVIDER - NSDCMRMEDTOKEN_GEN_ALL_CORE_FT
azelastine 0.05% ophthalmic solution: 1 drop(s) to each affected eye one  times a day  benzonatate 100 mg oral capsule: 1 cap(s) orally 3 times a day  Calcium 600+D oral tablet:   Colace 100 mg oral capsule: 1 cap(s) orally 2 times a day  diclofenac topical: daily   ezetimibe 10 mg oral tablet: 1 tab(s) orally once a day  Fish Oil oral capsule:   Flonase 50 mcg/inh nasal spray: 1 spray(s) nasal once a day  gabapentin: 300 mg orally  ipratropium 42 mcg/inh (0.06%) nasal spray: 2 spray(s) nasal 3 times a day  L-theanine:   levothyroxine 50 mcg (0.05 mg) oral tablet: 1 tab(s) orally once a day  meloxicam 7.5 mg oral tablet: 1 tab(s) orally once a day  MiraLax oral powder for reconstitution:   montelukast 10 mg oral tablet: 1 tab(s) orally once a day-evening   multivitamin:   nystatin-triamcinolone topical:   Qvar 40 mcg/inh inhalation aerosol: 2  puff(s) inhaled 2 times a day  Sudafed PE 10 mg oral tablet: 1 tab(s) orally every 4 hours, As Needed  Systane ophthalmic solution: 1 drop(s) to each affected eye 4 times a day  Systane ophthalmic solution: 1 drop(s) to each affected eye one  times a day  terbutaline 5 mg oral tablet: 2  tab(s) orally 2  times a day  tiZANidine 4 mg oral tablet: 1 tab(s) orally every 8 hours  Vitamin B6 100 mg oral tablet: 1 tab(s) orally once a day azelastine 0.05% ophthalmic solution: 1 drop(s) to each affected eye one  times a day  benzonatate 100 mg oral capsule: 1 cap(s) orally 3 times a day  Colace 100 mg oral capsule: 1 cap(s) orally 2 times a day  diclofenac topical: daily   ezetimibe 10 mg oral tablet: 1 tab(s) orally once a day  Fish Oil oral capsule:   Flonase 50 mcg/inh nasal spray: 1 spray(s) nasal once a day  gabapentin: 300 mg orally  ipratropium 42 mcg/inh (0.06%) nasal spray: 2 spray(s) nasal 3 times a day  L-theanine:   MiraLax oral powder for reconstitution:   nystatin-triamcinolone topical:   oxycodone-acetaminophen 5 mg-325 mg oral tablet: 1 tab(s) orally every 4 hours MDD:6  Systane ophthalmic solution: 1 drop(s) to each affected eye 4 times a day  Systane ophthalmic solution: 1 drop(s) to each affected eye one  times a day  terbutaline 5 mg oral tablet: 2  tab(s) orally 2  times a day  tiZANidine 4 mg oral tablet: 1 tab(s) orally every 8 hours  Vitamin B6 100 mg oral tablet: 1 tab(s) orally once a day azelastine 0.05% ophthalmic solution: 1 drop(s) to each affected eye one  times a day  beclomethasone 40 mcg/inh inhalation aerosol: 2 puff(s) inhaled 2 times a day  benzonatate 100 mg oral capsule: 1 cap(s) orally 3 times a day  Colace 100 mg oral capsule: 1 cap(s) orally 2 times a day  diclofenac topical: daily   ezetimibe 10 mg oral tablet: 1 tab(s) orally once a day  Fish Oil oral capsule:   Flonase 50 mcg/inh nasal spray: 1 spray(s) nasal once a day  gabapentin: 300 mg orally  ipratropium 42 mcg/inh (0.06%) nasal spray: 2 spray(s) nasal 3 times a day  L-theanine:   levothyroxine 50 mcg (0.05 mg) oral tablet: 1 tab(s) orally once a day  MiraLax oral powder for reconstitution:   montelukast 10 mg oral tablet: 1 tab(s) orally once a day (at bedtime)  Multiple Vitamins oral tablet: 1 tab(s) orally once a day  nystatin-triamcinolone topical:   oxycodone-acetaminophen 5 mg-325 mg oral tablet: 1 tab(s) orally every 4 hours MDD:6  pantoprazole 40 mg oral delayed release tablet: 1 tab(s) orally once a day (before a meal)  Systane ophthalmic solution: 1 drop(s) to each affected eye 4 times a day  terbutaline 5 mg oral tablet: 2  tab(s) orally 2  times a day  tiZANidine 4 mg oral tablet: 1 tab(s) orally every 8 hours  Vitamin B6 100 mg oral tablet: 1 tab(s) orally once a day

## 2020-03-10 NOTE — DISCHARGE NOTE PROVIDER - NSDCFUADDINST_GEN_ALL_CORE_FT
Discharge Instructions for Total Knee Arthroplasty    1. Activity: WBAT, Rolling walker, Daily PT. Gentle ROM 0-full as tolerated. Walk plenty.  Immobilizer while asleep x 3 weeks.   2. Call with: fever over 101, wound redness, drainage or open area, calf pain/calf swelling  3. Bandage: Dry serile dressing and ACE every 3 days to knee.  Also PLEASE CHANGE AS NEEDED. No bandage needed after staple removal.  4. Showering: Not allowed due to dressing. Sponge bathe only.  5. Staples: RN Remove Staples POD14 (MAR24).   6. DVT PE Prophylaxis: Managed by Anticoag Team. See Med Rec.  7. Continue Protonix daily while on Anticoagulant. an eRx has been sent to your pharmacy.  8. Labs: INR if on Coumadin.  9. Follow Up: Dr. Valdes 1 month;  Call to schedule.  10. Pain medications:  If going home, eRX sent to your pharmacy for .   11.**Call office if medications not covered under your insurance , especially BLOOD CLOT PREVENTION/anticoagulant medication. Discharge Instructions for Right Total Knee Arthroplasty    1. Activity: WBAT, Rolling walker, Daily PT. Gentle ROM 0-full as tolerated. Walk plenty.  Immobilizer while asleep x 3 weeks.   2. Call with: fever over 101, wound redness, drainage or open area, calf pain/calf swelling  3. Bandage: Dry serile dressing and ACE every 3 days to knee.  Also PLEASE CHANGE AS NEEDED. No bandage needed after staple removal.  4. Showering: Not allowed due to dressing. Sponge bathe only.  5. Staples: RN Remove Staples POD14 (3/24/20).   6. DVT PE Prophylaxis: Managed by Anticoag Team. See Med Rec.  7. Continue Protonix daily while on Anticoagulant. an eRx has been sent to your pharmacy.  8. Labs: INR if on Coumadin.  9. Follow Up: Dr. Valdes 1 month; Call to schedule.  10. Pain medications:  If going home, eRX sent to your pharmacy for .   11.**Call office if medications not covered under your insurance, especially BLOOD CLOT PREVENTION/anticoagulant medication.

## 2020-03-10 NOTE — CONSULT NOTE ADULT - SUBJECTIVE AND OBJECTIVE BOX
PCP- DR Chino Benavides    CC- s/p RT TKR    HPI:  68yo/F with PMH asthma, multiple seasonal allergies, hypothyroidism, hyperlipidemia, OA presented for elective RT TKR. Patient had pain in her RT knee affecting her ambulation, she failed outpatient treatment and required surgery. Medical consult called for postop medical management    PMH- as above  PSH- Lt humerus surgery, paraesophageal hernia repair, , RT breast surgery x2  Soc hx- passive smoker, denies alcohol  Fam hx- m had colon ca and HTN, f had hyperlipidemia    3/10/20-seen postop, doing good    Review of system- All 10 systems reviewed and is as per HPI otherwise negative.     T(C): 36.7 (03-10-20 @ 16:58), Max: 37.2 (03-10-20 @ 09:17)  HR: 94 (03-10-20 @ 16:58) (74 - 94)  BP: 141/83 (03-10-20 @ 16:58) (112/54 - 161/80)  RR: 16 (03-10-20 @ 16:58) (12 - 20)  SpO2: 98% (03-10-20 @ 16:58) (95% - 100%)  Wt(kg): --    LABS:                        11.4   9.25  )-----------( 324      ( 10 Mar 2020 13:14 )             32.2     139  |  107  |  17  ----------------------------<  132<H>  4.8   |  27  |  0.77    Ca    9.4      10 Mar 2020 13:14    RADIOLOGY & ADDITIONAL TESTS:      PHYSICAL EXAM:  GENERAL: NAD, well-groomed, well-developed  HEAD:  Atraumatic, Normocephalic  EYES: EOMI, PERRLA, conjunctiva and sclera clear  HEENT: Moist mucous membranes  NECK: Supple, No JVD  NERVOUS SYSTEM:  Alert & Oriented X3, Motor Strength 5/5 B/L upper and lower extremities; DTRs 2+ intact and symmetric  CHEST/LUNG: Clear to auscultation bilaterally; No rales, rhonchi, wheezing, or rubs  HEART: Regular rate and rhythm; No murmurs, rubs, or gallops  ABDOMEN: Soft, Nontender, Nondistended; Bowel sounds present  GENITOURINARY- Voiding, no palpable bladder  EXTREMITIES:  2+ Peripheral Pulses, No clubbing, cyanosis, or edema  MUSCULOSKELTAL- RT knee dressing dry  SKIN-no rash, no lesion  CNS- alert, oriented X3, non focal       Daily Height in cm: 149.86 (10 Mar 2020 09:17)        MEDICATIONS  (STANDING):  acetaminophen   Tablet .. 975 milliGRAM(s) Oral every 8 hours  acetaminophen   Tablet .. 975 milliGRAM(s) Oral once  artificial  tears Solution 1 Drop(s) Both EYES four times a day  ascorbic acid 500 milliGRAM(s) Oral two times a day  benzonatate 100 milliGRAM(s) Oral three times a day  famotidine    Tablet 20 milliGRAM(s) Oral once  fluticasone propionate (50 MICROgram(s)/actuation) Nasal Spray - Peds 1 Spray(s) Alternating Nostrils daily  folic acid 1 milliGRAM(s) Oral daily  gabapentin 300 milliGRAM(s) Oral daily  lactated ringers. 1000 milliLiter(s) (75 mL/Hr) IV Continuous <Continuous>  levothyroxine 50 MICROGram(s) Oral daily  montelukast 10 milliGRAM(s) Oral at bedtime  multivitamin 1 Tablet(s) Oral daily  pantoprazole    Tablet 40 milliGRAM(s) Oral once  pantoprazole    Tablet 40 milliGRAM(s) Oral before breakfast  polyethylene glycol 3350 17 Gram(s) Oral daily  rivaroxaban 10 milliGRAM(s) Oral daily  sodium chloride 0.9% lock flush 3 milliLiter(s) IV Push every 8 hours  terbutaline 5 milliGRAM(s) Oral three times a day  vancomycin  IVPB 1000 milliGRAM(s) IV Intermittent every 12 hours    MEDICATIONS  (PRN):  aluminum hydroxide/magnesium hydroxide/simethicone Suspension 30 milliLiter(s) Oral four times a day PRN Indigestion  HYDROmorphone  Injectable 0.5 milliGRAM(s) SubCutaneous every 4 hours PRN Severe Pain (7 - 10)  ondansetron Injectable 4 milliGRAM(s) IV Push every 6 hours PRN Nausea and/or Vomiting  oxyCODONE    IR 5 milliGRAM(s) Oral every 4 hours PRN Mild Pain (1 - 3)  oxyCODONE    IR 10 milliGRAM(s) Oral every 4 hours PRN Moderate Pain (4 - 6)  senna 2 Tablet(s) Oral at bedtime PRN Constipation      Assessment/Plan  #S/p RT TKR  Ortho f/u appreciated  PT as tolerated  Monitor HH  Pain meds prn  AC by xarelto  Bowel regimen  Incentive spirometry    #Asthma  Cont home meds    #Hypothyroidism/hyperlipidemia  Cont home meds    #DVT proph- on xarelto    #Dispo- thank you for consult, will follow with you. D/w pt and ortho team

## 2020-03-10 NOTE — PHYSICAL THERAPY INITIAL EVALUATION ADULT - MODALITIES TREATMENT COMMENTS
Patient returned to bed by request, call bell in reach and bed alarm active. All lines intact, Family at bedside. LIZETH, RN informed of session/status.

## 2020-03-11 ENCOUNTER — TRANSCRIPTION ENCOUNTER (OUTPATIENT)
Age: 70
End: 2020-03-11

## 2020-03-11 LAB
ANION GAP SERPL CALC-SCNC: 5 MMOL/L — SIGNIFICANT CHANGE UP (ref 5–17)
BUN SERPL-MCNC: 20 MG/DL — SIGNIFICANT CHANGE UP (ref 7–23)
CALCIUM SERPL-MCNC: 9.2 MG/DL — SIGNIFICANT CHANGE UP (ref 8.5–10.1)
CHLORIDE SERPL-SCNC: 102 MMOL/L — SIGNIFICANT CHANGE UP (ref 96–108)
CO2 SERPL-SCNC: 28 MMOL/L — SIGNIFICANT CHANGE UP (ref 22–31)
CREAT SERPL-MCNC: 0.82 MG/DL — SIGNIFICANT CHANGE UP (ref 0.5–1.3)
GLUCOSE SERPL-MCNC: 147 MG/DL — HIGH (ref 70–99)
HCT VFR BLD CALC: 28.8 % — LOW (ref 34.5–45)
HGB BLD-MCNC: 10.3 G/DL — LOW (ref 11.5–15.5)
MCHC RBC-ENTMCNC: 33 PG — SIGNIFICANT CHANGE UP (ref 27–34)
MCHC RBC-ENTMCNC: 35.8 GM/DL — SIGNIFICANT CHANGE UP (ref 32–36)
MCV RBC AUTO: 92.3 FL — SIGNIFICANT CHANGE UP (ref 80–100)
PLATELET # BLD AUTO: 301 K/UL — SIGNIFICANT CHANGE UP (ref 150–400)
POTASSIUM SERPL-MCNC: 4 MMOL/L — SIGNIFICANT CHANGE UP (ref 3.5–5.3)
POTASSIUM SERPL-SCNC: 4 MMOL/L — SIGNIFICANT CHANGE UP (ref 3.5–5.3)
RBC # BLD: 3.12 M/UL — LOW (ref 3.8–5.2)
RBC # FLD: 13.2 % — SIGNIFICANT CHANGE UP (ref 10.3–14.5)
SODIUM SERPL-SCNC: 135 MMOL/L — SIGNIFICANT CHANGE UP (ref 135–145)
WBC # BLD: 14.82 K/UL — HIGH (ref 3.8–10.5)
WBC # FLD AUTO: 14.82 K/UL — HIGH (ref 3.8–10.5)

## 2020-03-11 PROCEDURE — 99232 SBSQ HOSP IP/OBS MODERATE 35: CPT

## 2020-03-11 PROCEDURE — 99231 SBSQ HOSP IP/OBS SF/LOW 25: CPT

## 2020-03-11 RX ORDER — RIVAROXABAN 15 MG-20MG
1 KIT ORAL
Qty: 10 | Refills: 0
Start: 2020-03-11 | End: 2020-03-20

## 2020-03-11 RX ADMIN — SODIUM CHLORIDE 3 MILLILITER(S): 9 INJECTION INTRAMUSCULAR; INTRAVENOUS; SUBCUTANEOUS at 13:35

## 2020-03-11 RX ADMIN — Medication 5 MILLIGRAM(S): at 13:35

## 2020-03-11 RX ADMIN — Medication 975 MILLIGRAM(S): at 20:33

## 2020-03-11 RX ADMIN — Medication 1 TABLET(S): at 12:03

## 2020-03-11 RX ADMIN — GABAPENTIN 300 MILLIGRAM(S): 400 CAPSULE ORAL at 12:03

## 2020-03-11 RX ADMIN — Medication 5 MILLIGRAM(S): at 06:14

## 2020-03-11 RX ADMIN — PANTOPRAZOLE SODIUM 40 MILLIGRAM(S): 20 TABLET, DELAYED RELEASE ORAL at 06:15

## 2020-03-11 RX ADMIN — OXYCODONE HYDROCHLORIDE 10 MILLIGRAM(S): 5 TABLET ORAL at 03:01

## 2020-03-11 RX ADMIN — OXYCODONE HYDROCHLORIDE 10 MILLIGRAM(S): 5 TABLET ORAL at 09:20

## 2020-03-11 RX ADMIN — Medication 1 DROP(S): at 17:08

## 2020-03-11 RX ADMIN — Medication 101.6 MILLIGRAM(S): at 06:15

## 2020-03-11 RX ADMIN — SODIUM CHLORIDE 3 MILLILITER(S): 9 INJECTION INTRAMUSCULAR; INTRAVENOUS; SUBCUTANEOUS at 21:01

## 2020-03-11 RX ADMIN — Medication 975 MILLIGRAM(S): at 20:36

## 2020-03-11 RX ADMIN — Medication 975 MILLIGRAM(S): at 13:35

## 2020-03-11 RX ADMIN — OXYCODONE HYDROCHLORIDE 10 MILLIGRAM(S): 5 TABLET ORAL at 12:03

## 2020-03-11 RX ADMIN — OXYCODONE HYDROCHLORIDE 10 MILLIGRAM(S): 5 TABLET ORAL at 08:28

## 2020-03-11 RX ADMIN — MONTELUKAST 10 MILLIGRAM(S): 4 TABLET, CHEWABLE ORAL at 20:34

## 2020-03-11 RX ADMIN — Medication 5 MILLIGRAM(S): at 20:32

## 2020-03-11 RX ADMIN — ONDANSETRON 4 MILLIGRAM(S): 8 TABLET, FILM COATED ORAL at 17:07

## 2020-03-11 RX ADMIN — SODIUM CHLORIDE 3 MILLILITER(S): 9 INJECTION INTRAMUSCULAR; INTRAVENOUS; SUBCUTANEOUS at 05:02

## 2020-03-11 RX ADMIN — Medication 500 MILLIGRAM(S): at 06:15

## 2020-03-11 RX ADMIN — OXYCODONE HYDROCHLORIDE 10 MILLIGRAM(S): 5 TABLET ORAL at 20:31

## 2020-03-11 RX ADMIN — POLYETHYLENE GLYCOL 3350 17 GRAM(S): 17 POWDER, FOR SOLUTION ORAL at 12:04

## 2020-03-11 RX ADMIN — RIVAROXABAN 10 MILLIGRAM(S): KIT at 12:03

## 2020-03-11 RX ADMIN — Medication 1 MILLIGRAM(S): at 12:03

## 2020-03-11 RX ADMIN — OXYCODONE HYDROCHLORIDE 10 MILLIGRAM(S): 5 TABLET ORAL at 21:30

## 2020-03-11 RX ADMIN — Medication 975 MILLIGRAM(S): at 06:14

## 2020-03-11 RX ADMIN — OXYCODONE HYDROCHLORIDE 10 MILLIGRAM(S): 5 TABLET ORAL at 16:02

## 2020-03-11 RX ADMIN — OXYCODONE HYDROCHLORIDE 10 MILLIGRAM(S): 5 TABLET ORAL at 13:00

## 2020-03-11 RX ADMIN — OXYCODONE HYDROCHLORIDE 10 MILLIGRAM(S): 5 TABLET ORAL at 02:06

## 2020-03-11 RX ADMIN — Medication 50 MICROGRAM(S): at 06:14

## 2020-03-11 RX ADMIN — Medication 975 MILLIGRAM(S): at 13:36

## 2020-03-11 RX ADMIN — Medication 975 MILLIGRAM(S): at 06:16

## 2020-03-11 NOTE — PROGRESS NOTE ADULT - ASSESSMENT
A/P:  Patient is a 69y Female s/p R TKA Stable POD 1    -Ice/Elevate  -Incentive Spirometry  -Multimodal Analgesia  -Ppx ABX - stop after 3 doses  -DVT PE ppx - per AC  -SCDs  -PT/OT OOB  -WBAT  -Discharge planning - pending PT eval  -Will discuss w/ attending and advise if plan changes

## 2020-03-11 NOTE — PROGRESS NOTE ADULT - SUBJECTIVE AND OBJECTIVE BOX
HPI:      Patient is a 69y old  Female who presents with a chief complaint of R Knee pain Now on 2 North Right TKA (10 Mar 2020 10:29)      Consulted by     for VTE prophylaxis, risk stratification, and anticoagulation management.    PAST MEDICAL & SURGICAL HISTORY:  Back pain  Acute sinusitis  Constipation  Paraesophageal hiatal hernia: repair done  Heart murmur  Psoriasis  Bronchial asthma: recent broncohitis;  never intubated  Cataract, left  Hypothyroidism  Environmental and seasonal allergies  HLD (hyperlipidemia)  OA (osteoarthritis)  History of breast surgery: excision of papilloma right breast x 2  H/O shoulder surgery: left due to fracture  H/O: :   S/P repair of paraesophageal hernia          CrCl:65.2  EBL:150  BMI:27.8    Caprini VTE Risk Score:CAPRINI SCORE  AGE RELATED RISK FACTORS                                                       MOBILITY RELATED FACTORS  [ ] Age 41-60 years                                            (1 Point)                  [ x] Bed rest /restricted mobility                             (1 Point)  [x ] Age: 61-74 years                                           (2 Points)                [ ] Plaster cast                                                   (2 Points)  [ ] Age= 75 years                                              (3 Points)                 [ ] Bed bound for more than 72 hours                   (2 Points)    DISEASE RELATED RISK FACTORS                                               GENDER SPECIFIC FACTORS  [ ] Edema in the lower extremities                       (1 Point)           [ ] Pregnancy                                                            (1 Point)  [ ] Varicose veins                                               (1 Point)                  [ ] Post-partum < 6 weeks                                      (1 Point)             [x ] BMI > 25 Kg/m2                                            (1 Point)                  [ ] Hormonal therapy or oral contraception       (1 Point)                 [ ] Sepsis (in the previous month)                        (1 Point)             [ ] History of pregnancy complications                (1Point)  [ ] Pneumonia or serious lung disease                                             [ ] Unexplained or recurrent  (=/>3), premature                                 (In the previous month)                               (1 Point)                birth with toxemia or growth-restricted infant (1 Point)  [ ] Abnormal pulmonary function test            (1 Point)                                   SURGERY RELATED RISK FACTORS  [ ] Acute myocardial infarction                       (1 Point)                  [ ]  Section                                         (1 Point)  [ ] Congestive heart failure (in the previous month) (1 Point)   [ ] Minor surgery   lasting <45 minutes       (1 Point)   [ ] Inflammatory bowel disease                             (1 Point)          [ ] Arthroscopic surgery                                  (2 Points)  [ ] Central venous access                                    (2 Points)            [ ] General surgery lasting >45 minutes      (2 Points)       [ ] Stroke (in the previous month)                  (5 Points)            [x ] Elective major lower extremity arthroplasty (5 Points)                                   [  ] Malignancy (present or past include skin melanoma                                          but exclude  basal skin cell)    (2 points)                                      TRAUMA RELATED RISK FACTORS                HEMATOLOGY RELATED FACTORS                                  [ ] Fracture of the hip, pelvis, or leg                       (5 Points)  [ ] Prior episodes of VTE                                     (3 Points)          [ ] Acute spinal cord injury (in the previous month)  (5 Points)  [ ] Positive family history for VTE                         (3 Points)       [ ] Paralysis (less than 1 month)                          (5 Points)  [ ] Prothrombin 47036 A                                      (3 Points)         [ ] Multiple Trauma (within 1month)                 (5Points)                                                                                                                                                                [ ] Factor V Leiden                                          (3 Points)                                OTHER RISK FACTORS                          [ ] Lupus anticoagulants                                     (3 Points)                       [ ] BMI > 40                          (1 Point)                                                         [ ] Anticardiolipin antibodies                                (3 Points)                   [ ] Smoking                              (1Point)                                                [ ] High homocysteine in the blood                      (3 Points)                [  ] Diabetes requiring insulin (1point)                         [ ] Other congenital or acquired thrombophilia       (3 Points)          [  ] Chemotherapy                   (1 Point)  [ ] Heparin induced thrombocytopenia                  (3 Points)             [  ] Blood Transfusion                (1 point)                                                                                                             Total Score [  9       ]                                                                                                                                                                                                                                                                                                                                                                                                                                         IMPROVE Bleeding Risk Score:1.5    Torniq time:     Time In: 11:18      Falls Risk:   High (  )  Mod ( x )  Low (  )      FAMILY HISTORY:  FH: hyperlipidemia: mother and father  FH: hypertension: mother  FH: colon cancer: mother    Denies any personal or familial history of clotting or bleeding disorders.    Allergies    Celebrex (Other)  codeine (Angioedema)  Keflex (Unknown)  penicillins (Unknown)  sulfa drugs (Unknown)    Intolerances        REVIEW OF SYSTEMS    (  )Fever	     (  )Constipation	(  )SOB				(  )Headache	(  )Dysuria  (  )Chills	     (  )Melena	(  )Dyspnea present on exertion	                    (  )Dizziness                    (  )Polyuria  (  )Nausea	     (  )Hematochezia	(  )Cough			                    (  )Syncope   	(  )Hematuria  (  )Vomiting    (  )Chest Pain	(  )Wheezing			(  )Weakness  (  )Diarrhea     (  )Palpitations	(  )Anorexia			( x )joint pain    All  other review of systems negative: Yes  Vital Signs Last 24 Hrs  T(C): 37.4 (11 Mar 2020 05:01), Max: 37.4 (11 Mar 2020 05:01)  T(F): 99.3 (11 Mar 2020 05:01), Max: 99.3 (11 Mar 2020 05:01)  HR: 98 (11 Mar 2020 05:01) (74 - 98)  BP: 152/55 (11 Mar 2020 05:01) (112/54 - 161/80)  BP(mean): --  RR: 16 (11 Mar 2020 05:01) (12 - 20)  SpO2: 95% (11 Mar 2020 05:01) (95% - 100%)    PHYSICAL EXAM:    Constitutional: Appears Well    Neurological: A& O x 3    Skin: Warm    Respiratory and Thorax: normal effort; Breath sounds: normal; No rales/wheezing/rhonchi  	  Cardiovascular: S1, S2, regular, NMBR	    Gastrointestinal: BS + x 4Q, nontender	    Genitourinary:  Bladder nondistended, nontender    Musculoskeletal:   General Right:   + muscle/joint tenderness,   normal tone, no joint swelling,   ROM: limited	    General Left:   no muscle/joint tenderness,   normal tone, no joint swelling,   ROM: full      Knee:  Right: Incision: ; Dressing CDI;                   Lower extrems:   Right: no calf tenderness              negative lindsay's sign               + pedal pulses    Left:   no calf tenderness              negative lindsay's sign               + pedal pulses                        10.3   14.82 )-----------( 301      ( 11 Mar 2020 06:26 )             28.8       03-11    135  |  102  |  20  ----------------------------<  147<H>  4.0   |  28  |  0.82    Ca    9.2      11 Mar 2020 06:26                          11.4   9.25  )-----------( 324      ( 10 Mar 2020 13:14 )             32.2       03-10    139  |  107  |  17  ----------------------------<  132<H>  4.8   |  27  |  0.77    Ca    9.4      10 Mar 2020 13:14        MEDICATIONS  (STANDING):  acetaminophen   Tablet .. 975 milliGRAM(s) Oral every 8 hours  acetaminophen   Tablet .. 975 milliGRAM(s) Oral once  ascorbic acid 500 milliGRAM(s) Oral two times a day  benzonatate 100 milliGRAM(s) Oral three times a day  famotidine    Tablet 20 milliGRAM(s) Oral once  fluticasone propionate (50 MICROgram(s)/actuation) Nasal Spray - Peds 1 Spray(s) Alternating Nostrils daily  folic acid 1 milliGRAM(s) Oral daily  gabapentin 300 milliGRAM(s) Oral daily  lactated ringers. 1000 milliLiter(s) (75 mL/Hr) IV Continuous <Continuous>  levothyroxine 50 MICROGram(s) Oral daily  montelukast 10 milliGRAM(s) Oral daily  multivitamin 1 Tablet(s) Oral daily  pantoprazole    Tablet 40 milliGRAM(s) Oral once  pantoprazole    Tablet 40 milliGRAM(s) Oral before breakfast  polyethylene glycol 3350 17 Gram(s) Oral daily  polyvinyl alcohol 1.4%/povidone 0.6% Ophthalmic Solution - Peds 1 Drop(s) Both EYES four times a day  rivaroxaban 10 milliGRAM(s) Oral daily  sodium chloride 0.9% lock flush 3 milliLiter(s) IV Push every 8 hours  terbutaline 10 milliGRAM(s) Oral two times a day  vancomycin  IVPB 1000 milliGRAM(s) IV Intermittent every 12 hours          DVT Prophylaxis:  LMWH                   (  )  Heparin SQ           (  )  Coumadin             (  )  Xarelto                  ( x )  Eliquis                   (  )  Venodynes           (x  )  Ambulation          ( x )  UFH                       (  )  Contraindicated  (  )  EC ASPIRIN       (  ) HPI:      Patient is a 69y old  Female who presents with a chief complaint of R Knee pain Now on 2 North Right TKA (10 Mar 2020 10:29)      Consulted by     for VTE prophylaxis, risk stratification, and anticoagulation management.    PAST MEDICAL & SURGICAL HISTORY:  Back pain  Acute sinusitis  Constipation  Paraesophageal hiatal hernia: repair done  Heart murmur  Psoriasis  Bronchial asthma: recent broncohitis;  never intubated  Cataract, left  Hypothyroidism  Environmental and seasonal allergies  HLD (hyperlipidemia)  OA (osteoarthritis)  History of breast surgery: excision of papilloma right breast x 2  H/O shoulder surgery: left due to fracture  H/O: :   S/P repair of paraesophageal hernia          CrCl:65.2  EBL:150  BMI:27.8    Caprini VTE Risk Score:CAPRINI SCORE  AGE RELATED RISK FACTORS                                                       MOBILITY RELATED FACTORS  [ ] Age 41-60 years                                            (1 Point)                  [ x] Bed rest /restricted mobility                             (1 Point)  [x ] Age: 61-74 years                                           (2 Points)                [ ] Plaster cast                                                   (2 Points)  [ ] Age= 75 years                                              (3 Points)                 [ ] Bed bound for more than 72 hours                   (2 Points)    DISEASE RELATED RISK FACTORS                                               GENDER SPECIFIC FACTORS  [ ] Edema in the lower extremities                       (1 Point)           [ ] Pregnancy                                                            (1 Point)  [ ] Varicose veins                                               (1 Point)                  [ ] Post-partum < 6 weeks                                      (1 Point)             [x ] BMI > 25 Kg/m2                                            (1 Point)                  [ ] Hormonal therapy or oral contraception       (1 Point)                 [ ] Sepsis (in the previous month)                        (1 Point)             [ ] History of pregnancy complications                (1Point)  [ ] Pneumonia or serious lung disease                                             [ ] Unexplained or recurrent  (=/>3), premature                                 (In the previous month)                               (1 Point)                birth with toxemia or growth-restricted infant (1 Point)  [ ] Abnormal pulmonary function test            (1 Point)                                   SURGERY RELATED RISK FACTORS  [ ] Acute myocardial infarction                       (1 Point)                  [ ]  Section                                         (1 Point)  [ ] Congestive heart failure (in the previous month) (1 Point)   [ ] Minor surgery   lasting <45 minutes       (1 Point)   [ ] Inflammatory bowel disease                             (1 Point)          [ ] Arthroscopic surgery                                  (2 Points)  [ ] Central venous access                                    (2 Points)            [ ] General surgery lasting >45 minutes      (2 Points)       [ ] Stroke (in the previous month)                  (5 Points)            [x ] Elective major lower extremity arthroplasty (5 Points)                                   [  ] Malignancy (present or past include skin melanoma                                          but exclude  basal skin cell)    (2 points)                                      TRAUMA RELATED RISK FACTORS                HEMATOLOGY RELATED FACTORS                                  [ ] Fracture of the hip, pelvis, or leg                       (5 Points)  [ ] Prior episodes of VTE                                     (3 Points)          [ ] Acute spinal cord injury (in the previous month)  (5 Points)  [ ] Positive family history for VTE                         (3 Points)       [ ] Paralysis (less than 1 month)                          (5 Points)  [ ] Prothrombin 28254 A                                      (3 Points)         [ ] Multiple Trauma (within 1month)                 (5Points)                                                                                                                                                                [ ] Factor V Leiden                                          (3 Points)                                OTHER RISK FACTORS                          [ ] Lupus anticoagulants                                     (3 Points)                       [ ] BMI > 40                          (1 Point)                                                         [ ] Anticardiolipin antibodies                                (3 Points)                   [ ] Smoking                              (1Point)                                                [ ] High homocysteine in the blood                      (3 Points)                [  ] Diabetes requiring insulin (1point)                         [ ] Other congenital or acquired thrombophilia       (3 Points)          [  ] Chemotherapy                   (1 Point)  [ ] Heparin induced thrombocytopenia                  (3 Points)             [  ] Blood Transfusion                (1 point)                                                                                                             Total Score [  9       ]                                                                                                                                                                                                                                                                                                                                                                                                                                         IMPROVE Bleeding Risk Score:1.5    Torniq time:     Time In: 11:18      Falls Risk:   High (  )  Mod ( x )  Low (  )      FAMILY HISTORY:  FH: hyperlipidemia: mother and father  FH: hypertension: mother  FH: colon cancer: mother    Denies any personal or familial history of clotting or bleeding disorders.    Allergies    Celebrex (Other)  codeine (Angioedema)  Keflex (Unknown)  penicillins (Unknown)  sulfa drugs (Unknown)    Intolerances        REVIEW OF SYSTEMS    (  )Fever	     (  )Constipation	(  )SOB				(  )Headache	(  )Dysuria  (  )Chills	     (  )Melena	(  )Dyspnea present on exertion	                    (  )Dizziness                    (  )Polyuria  (  )Nausea	     (  )Hematochezia	(  )Cough			                    (  )Syncope   	(  )Hematuria  (  )Vomiting    (  )Chest Pain	(  )Wheezing			(  )Weakness  (  )Diarrhea     (  )Palpitations	(  )Anorexia			( x )joint pain    All  other review of systems negative: Yes  Vital Signs Last 24 Hrs  T(C): 37.4 (11 Mar 2020 05:01), Max: 37.4 (11 Mar 2020 05:01)  T(F): 99.3 (11 Mar 2020 05:01), Max: 99.3 (11 Mar 2020 05:01)  HR: 98 (11 Mar 2020 05:01) (74 - 98)  BP: 152/55 (11 Mar 2020 05:01) (112/54 - 161/80)  BP(mean): --  RR: 16 (11 Mar 2020 05:01) (12 - 20)  SpO2: 95% (11 Mar 2020 05:01) (95% - 100%)    3/11/2020: Pt seen at bedside, sitting in the chair, no concerns regarding AC script sent, co pay discussed, Literature for Xarelto provided.    PHYSICAL EXAM:    Constitutional: Appears Well    Neurological: A& O x 3    Skin: Warm    Respiratory and Thorax: normal effort; Breath sounds: normal; No rales/wheezing/rhonchi  	  Cardiovascular: S1, S2, regular, NMBR	    Gastrointestinal: BS + x 4Q, nontender	    Genitourinary:  Bladder nondistended, nontender    Musculoskeletal:   General Right:   + muscle/joint tenderness,   normal tone, no joint swelling,   ROM: limited	    General Left:   no muscle/joint tenderness,   normal tone, no joint swelling,   ROM: full      Knee:  Right: Incision: ; Dressing CDI;                   Lower extrems:   Right: no calf tenderness              negative lindsay's sign               + pedal pulses    Left:   no calf tenderness              negative lindsay's sign               + pedal pulses                        10.3   14.82 )-----------( 301      ( 11 Mar 2020 06:26 )             28.8       03-11    135  |  102  |  20  ----------------------------<  147<H>  4.0   |  28  |  0.82    Ca    9.2      11 Mar 2020 06:26                          11.4   9.25  )-----------( 324      ( 10 Mar 2020 13:14 )             32.2       03-10    139  |  107  |  17  ----------------------------<  132<H>  4.8   |  27  |  0.77    Ca    9.4      10 Mar 2020 13:14        MEDICATIONS  (STANDING):  acetaminophen   Tablet .. 975 milliGRAM(s) Oral every 8 hours  acetaminophen   Tablet .. 975 milliGRAM(s) Oral once  ascorbic acid 500 milliGRAM(s) Oral two times a day  benzonatate 100 milliGRAM(s) Oral three times a day  famotidine    Tablet 20 milliGRAM(s) Oral once  fluticasone propionate (50 MICROgram(s)/actuation) Nasal Spray - Peds 1 Spray(s) Alternating Nostrils daily  folic acid 1 milliGRAM(s) Oral daily  gabapentin 300 milliGRAM(s) Oral daily  lactated ringers. 1000 milliLiter(s) (75 mL/Hr) IV Continuous <Continuous>  levothyroxine 50 MICROGram(s) Oral daily  montelukast 10 milliGRAM(s) Oral daily  multivitamin 1 Tablet(s) Oral daily  pantoprazole    Tablet 40 milliGRAM(s) Oral once  pantoprazole    Tablet 40 milliGRAM(s) Oral before breakfast  polyethylene glycol 3350 17 Gram(s) Oral daily  polyvinyl alcohol 1.4%/povidone 0.6% Ophthalmic Solution - Peds 1 Drop(s) Both EYES four times a day  rivaroxaban 10 milliGRAM(s) Oral daily  sodium chloride 0.9% lock flush 3 milliLiter(s) IV Push every 8 hours  terbutaline 10 milliGRAM(s) Oral two times a day  vancomycin  IVPB 1000 milliGRAM(s) IV Intermittent every 12 hours          DVT Prophylaxis:  LMWH                   (  )  Heparin SQ           (  )  Coumadin             (  )  Xarelto                  ( x )  Eliquis                   (  )  Venodynes           (x  )  Ambulation          ( x )  UFH                       (  )  Contraindicated  (  )  EC ASPIRIN       (  )

## 2020-03-11 NOTE — PROGRESS NOTE ADULT - SUBJECTIVE AND OBJECTIVE BOX
Patient seen and examined at bedside, resting comfortably. Pain adequately controlled. Patient feeling well. No nausea or vomiting. No other acute complaints at this time    Vital Signs Last 24 Hrs  T(C): 37.4 (11 Mar 2020 05:01), Max: 37.4 (11 Mar 2020 05:01)  T(F): 99.3 (11 Mar 2020 05:01), Max: 99.3 (11 Mar 2020 05:01)  HR: 98 (11 Mar 2020 05:01) (74 - 98)  BP: 152/55 (11 Mar 2020 05:01) (112/54 - 161/80)  BP(mean): --  RR: 16 (11 Mar 2020 05:01) (12 - 20)  SpO2: 95% (11 Mar 2020 05:01) (95% - 100%)                          11.4   9.25  )-----------( 324      ( 10 Mar 2020 13:14 )             32.2   03-10    139  |  107  |  17  ----------------------------<  132<H>  4.8   |  27  |  0.77    Ca    9.4      10 Mar 2020 13:14        Exam:  Gen: NAD, Awake and alert  RLE  Dressing clean and dry  +EHL/FHL/TA/GS  SILT L2-S1  +DP  Calf Soft NT  Compartments soft and compressible

## 2020-03-11 NOTE — DISCHARGE NOTE NURSING/CASE MANAGEMENT/SOCIAL WORK - PATIENT PORTAL LINK FT
You can access the FollowMyHealth Patient Portal offered by [facility] by registering at the following website: [email or web address]. By joining [facility]’s FollowMyHealth portal, you will also be able to view your health information using other applications (apps) compatible with our system.

## 2020-03-11 NOTE — PROGRESS NOTE ADULT - ASSESSMENT
Patient is a 69y old  Female who presents with a chief complaint of R Knee pain Now on 2 North s/p Right TKA (10 Mar 2020 10:29)  Consulted by     for VTE prophylaxis, risk stratification, and anticoagulation management. Pt is VTE risk due to surgery and restricted mobility. Pt is low bleeding risk.    Plan  D/W pt use of Xarelto, risks vs benefits and pt is in agreement to use Xarelto    Continue Xarelto 10 mg po daily for  x 12 days (tentatively till 3/22/2020)  Protonix 40 mg po daily while on Xarelto  Daily CBC, BMP  BLE venodynes  INC mobility as kenisha   will follow  Dispo:Home, Script sent to pharmacy, co pay $8, pt aware

## 2020-03-11 NOTE — DISCHARGE NOTE NURSING/CASE MANAGEMENT/SOCIAL WORK - NSFLUVACAGEDISCH_IMM_ALL_CORE
[address] [place] [address] - [address] [place] [address]  Phone: [phone]  Fax: [phone]    KESHIA Sarabia - CNP        November 20, 2018     [id number]    Patient: Shyanne Wesley  MR Number: [id number]  YOB: 1946  Date of Visit: 11/20/2018    Dear Dr. Misty Mills:         [address]   Electrophysiology Note              Date:  November 20, 2018  Patient name: Shyanne Wesley  YOB: 1946    Primary Care physician: Misty Mills MD    HISTORY OF PRESENT ILLNESS: The patient is a 81 y.o.  male with a history of paroxysmal atrial fibrillation, HTN, DM, ED, and HLD. He had no known recurrence of AF since starting flecainide in 2014. Today he is being seen for atrial fibrillation. EKG shows atrial fibrillation with a HR of 111. He is unaware of the arrhythmia. States he had a recent URI. Denies chest pain, palpitations, shortness of breath, and dizziness. Past Medical History:   has a past medical history of Allergic rhinitis; Atrial fibrillation ([address] Chronic LBP; Diabetes mellitus ([address] Erectile dysfunction; Hypercholesteremia; Hypertension; Kidney stone on left side; Type II or unspecified type diabetes mellitus without mention of complication, not stated as uncontrolled; and Wears glasses. Past Surgical History:   has a past surgical history that includes Appendectomy (Right, 01/01/1955); skin biopsy (Right, 01/01/2007); and back surgery (4/24/14). Home Medications:    Prior to Admission medications    Medication Sig Start Date End Date Taking?  Authorizing Provider   flecainide (TAMBOCOR) 50 MG tablet Take 50 mg by mouth daily   Yes Historical Provider, MD   warfarin (COUMADIN) 5 MG tablet TAKE 1 TABLET ONE TIME DAILY  OR AS DIRECTED  BY  CLINIC 7/23/18  Yes David Roles, APRN - CNP   HYDROcodone-acetaminophen (NORCO) 7.5-325 MG per tablet TAKE ONE TABLET BY MOUTH EVERY 4-6 HOURS AS NEEDED FOR PAIN. MAXIMUM 5 per day. . 3/3/17  Yes Ruperto Blood MD   fenofibrate 160 MG tablet TAKE 1 TABLET ONE TIME DAILY 1/9/17  Yes Ruperto Blood MD   losartan-hydrochlorothiazide [place]) 100-12.5 MG per tablet TAKE 1 TABLET EVERY DAY 12/8/16  Yes Ruperto Blood MD   metFORMIN (GLUCOPHAGE) 1000 MG tablet TAKE 1 TABLET TWICE DAILY WITH MEALS 11/30/16  Yes Ruperto Blood MD   atorvastatin (LIPITOR) 40 MG tablet TAKE 1 TABLET BY MOUTH ONE TIME A DAY 11/17/16  Yes Ruperto Blood MD   benazepril (LOTENSIN) 10 MG tablet TAKE ONE TABLET BY MOUTH ONCE DAILY 10/17/16  Yes Ruperto Blood MD   metoprolol succinate (TOPROL XL) 50 MG extended release tablet TAKE 1 TABLET EVERY DAY 10/3/16  Yes Ruperto Blood MD   Multiple Vitamins-Minerals (THERAPEUTIC MULTIVITAMIN-MINERALS) tablet Take 1 tablet by mouth daily. Yes Historical Provider, MD   Cholecalciferol (VITAMIN D) 2000 UNITS CAPS capsule Take  by mouth daily. Yes Historical Provider, MD   vitamin B-12 (CYANOCOBALAMIN) 1000 MCG tablet Take 1,200 mcg by mouth daily. Yes Historical Provider, MD   saxagliptin (ONGLYZA) 5 MG TABS tablet Take 1 tablet by mouth daily 7/6/15   Ruperto Blood MD       Allergies:  Patient has no known allergies. Social History:   reports that he quit smoking about 13 years ago. His smoking use included Cigarettes. He has a 28.00 pack-year smoking history. He has never used smokeless tobacco. He reports that he drinks alcohol. He reports that he does not use drugs. Family History: family history includes Diabetes in his father; Heart Disease in his brother; High Blood Pressure in his mother; Stroke in his mother. REVIEW OF SYSTEMS:    Constitutional: Negative. HENT: Negative. Eyes: Negative. Respiratory: + recent URI  Cardiovascular: see HPI  Gastrointestinal: Negative. Genitourinary: Negative. Musculoskeletal: Negative. Skin: Negative. Neurological: Negative. Adult

## 2020-03-11 NOTE — PROGRESS NOTE ADULT - SUBJECTIVE AND OBJECTIVE BOX
PCP- DR Chino Benavides    CC- s/p RT TKR    HPI:  68yo/F with PMH asthma, multiple seasonal allergies, hypothyroidism, hyperlipidemia, OA presented for elective RT TKR. Patient had pain in her RT knee affecting her ambulation, she failed outpatient treatment and required surgery. Medical consult called for postop medical management    PMH- as above  PSH- Lt humerus surgery, paraesophageal hernia repair, , RT breast surgery x2  Soc hx- passive smoker, denies alcohol  Fam hx- m had colon ca and HTN, f had hyperlipidemia    3/10/20-seen postop, doing good  3/11/20- in pain. Ambulated with PT    Review of system- All 10 systems reviewed and is as per HPI otherwise negative.     Vital Signs Last 24 Hrs  T(C): 37.3 (11 Mar 2020 11:03), Max: 37.4 (11 Mar 2020 05:01)  T(F): 99.1 (11 Mar 2020 11:03), Max: 99.3 (11 Mar 2020 05:01)  HR: 98 (11 Mar 2020 11:) (74 - 98)  BP: 125/59 (11 Mar 2020 11:03) (115/57 - 155/76)  BP(mean): --  RR: 16 (11 Mar 2020 11:03) (16 - 18)  SpO2: 97% (11 Mar 2020 11:03) (95% - 100%)    LABS:                                10.3   14.82 )-----------( 301      ( 11 Mar 2020 06:26 )             28.8     11 Mar 2020 06:26    135    |  102    |  20     ----------------------------<  147    4.0     |  28     |  0.82     Ca    9.2        11 Mar 2020 06:26    RADIOLOGY & ADDITIONAL TESTS:      PHYSICAL EXAM:  GENERAL: NAD, well-groomed, well-developed  HEAD:  Atraumatic, Normocephalic  EYES: EOMI, PERRLA, conjunctiva and sclera clear  HEENT: Moist mucous membranes  NECK: Supple, No JVD  NERVOUS SYSTEM:  Alert & Oriented X3, Motor Strength 5/5 B/L upper and lower extremities; DTRs 2+ intact and symmetric  CHEST/LUNG: Clear to auscultation bilaterally; No rales, rhonchi, wheezing, or rubs  HEART: Regular rate and rhythm; No murmurs, rubs, or gallops  ABDOMEN: Soft, Nontender, Nondistended; Bowel sounds present  GENITOURINARY- Voiding, no palpable bladder  EXTREMITIES:  2+ Peripheral Pulses, No clubbing, cyanosis, or edema  MUSCULOSKELTAL- RT knee dressing dry  SKIN-no rash, no lesion  CNS- alert, oriented X3, non focal       Daily Height in cm: 149.86 (10 Mar 2020 09:17)        MEDICATIONS  (STANDING):  acetaminophen   Tablet .. 975 milliGRAM(s) Oral every 8 hours  acetaminophen   Tablet .. 975 milliGRAM(s) Oral once  artificial  tears Solution 1 Drop(s) Both EYES four times a day  ascorbic acid 500 milliGRAM(s) Oral two times a day  benzonatate 100 milliGRAM(s) Oral three times a day  famotidine    Tablet 20 milliGRAM(s) Oral once  fluticasone propionate (50 MICROgram(s)/actuation) Nasal Spray - Peds 1 Spray(s) Alternating Nostrils daily  folic acid 1 milliGRAM(s) Oral daily  gabapentin 300 milliGRAM(s) Oral daily  lactated ringers. 1000 milliLiter(s) (75 mL/Hr) IV Continuous <Continuous>  levothyroxine 50 MICROGram(s) Oral daily  montelukast 10 milliGRAM(s) Oral at bedtime  multivitamin 1 Tablet(s) Oral daily  pantoprazole    Tablet 40 milliGRAM(s) Oral once  pantoprazole    Tablet 40 milliGRAM(s) Oral before breakfast  polyethylene glycol 3350 17 Gram(s) Oral daily  rivaroxaban 10 milliGRAM(s) Oral daily  sodium chloride 0.9% lock flush 3 milliLiter(s) IV Push every 8 hours  terbutaline 5 milliGRAM(s) Oral three times a day  vancomycin  IVPB 1000 milliGRAM(s) IV Intermittent every 12 hours    MEDICATIONS  (PRN):  aluminum hydroxide/magnesium hydroxide/simethicone Suspension 30 milliLiter(s) Oral four times a day PRN Indigestion  HYDROmorphone  Injectable 0.5 milliGRAM(s) SubCutaneous every 4 hours PRN Severe Pain (7 - 10)  ondansetron Injectable 4 milliGRAM(s) IV Push every 6 hours PRN Nausea and/or Vomiting  oxyCODONE    IR 5 milliGRAM(s) Oral every 4 hours PRN Mild Pain (1 - 3)  oxyCODONE    IR 10 milliGRAM(s) Oral every 4 hours PRN Moderate Pain (4 - 6)  senna 2 Tablet(s) Oral at bedtime PRN Constipation      Assessment/Plan  #S/p RT TKR  #Mild anemia acute blood loss postop  Ortho f/u appreciated  PT as tolerated  Monitor HH  Pain meds prn  AC by xarelto  Bowel regimen  Incentive spirometry    #Asthma  Cont home meds    #Hypothyroidism/hyperlipidemia  Cont home meds    #DVT proph- on xarelto    #Dispo- continue PT, likely home with home PT in 1-2 days. D/w pt and ortho team

## 2020-03-12 VITALS
TEMPERATURE: 98 F | OXYGEN SATURATION: 98 % | DIASTOLIC BLOOD PRESSURE: 61 MMHG | RESPIRATION RATE: 16 BRPM | HEART RATE: 72 BPM | SYSTOLIC BLOOD PRESSURE: 126 MMHG

## 2020-03-12 LAB
ANION GAP SERPL CALC-SCNC: 3 MMOL/L — LOW (ref 5–17)
BUN SERPL-MCNC: 16 MG/DL — SIGNIFICANT CHANGE UP (ref 7–23)
CALCIUM SERPL-MCNC: 9 MG/DL — SIGNIFICANT CHANGE UP (ref 8.5–10.1)
CHLORIDE SERPL-SCNC: 101 MMOL/L — SIGNIFICANT CHANGE UP (ref 96–108)
CO2 SERPL-SCNC: 31 MMOL/L — SIGNIFICANT CHANGE UP (ref 22–31)
CREAT SERPL-MCNC: 0.73 MG/DL — SIGNIFICANT CHANGE UP (ref 0.5–1.3)
GLUCOSE SERPL-MCNC: 125 MG/DL — HIGH (ref 70–99)
HCT VFR BLD CALC: 27.6 % — LOW (ref 34.5–45)
HGB BLD-MCNC: 9 G/DL — LOW (ref 11.5–15.5)
MCHC RBC-ENTMCNC: 30.8 PG — SIGNIFICANT CHANGE UP (ref 27–34)
MCHC RBC-ENTMCNC: 32.6 GM/DL — SIGNIFICANT CHANGE UP (ref 32–36)
MCV RBC AUTO: 94.5 FL — SIGNIFICANT CHANGE UP (ref 80–100)
PLATELET # BLD AUTO: 271 K/UL — SIGNIFICANT CHANGE UP (ref 150–400)
POTASSIUM SERPL-MCNC: 4.2 MMOL/L — SIGNIFICANT CHANGE UP (ref 3.5–5.3)
POTASSIUM SERPL-SCNC: 4.2 MMOL/L — SIGNIFICANT CHANGE UP (ref 3.5–5.3)
RBC # BLD: 2.92 M/UL — LOW (ref 3.8–5.2)
RBC # FLD: 12.9 % — SIGNIFICANT CHANGE UP (ref 10.3–14.5)
SODIUM SERPL-SCNC: 135 MMOL/L — SIGNIFICANT CHANGE UP (ref 135–145)
WBC # BLD: 12.39 K/UL — HIGH (ref 3.8–10.5)
WBC # FLD AUTO: 12.39 K/UL — HIGH (ref 3.8–10.5)

## 2020-03-12 PROCEDURE — 99231 SBSQ HOSP IP/OBS SF/LOW 25: CPT

## 2020-03-12 PROCEDURE — 99232 SBSQ HOSP IP/OBS MODERATE 35: CPT

## 2020-03-12 PROCEDURE — 99024 POSTOP FOLLOW-UP VISIT: CPT

## 2020-03-12 RX ORDER — MELOXICAM 15 MG/1
1 TABLET ORAL
Qty: 0 | Refills: 0 | DISCHARGE

## 2020-03-12 RX ORDER — BECLOMETHASONE DIPROPIONATE 40 UG/1
1 AEROSOL, METERED RESPIRATORY (INHALATION)
Qty: 0 | Refills: 0 | DISCHARGE

## 2020-03-12 RX ORDER — PHENYLEPHRINE HYDROCHLORIDE 10 MG/ML
1 INJECTION INTRAVENOUS
Qty: 0 | Refills: 0 | DISCHARGE

## 2020-03-12 RX ORDER — LEVOTHYROXINE SODIUM 125 MCG
1 TABLET ORAL
Qty: 0 | Refills: 0 | DISCHARGE
Start: 2020-03-12

## 2020-03-12 RX ORDER — PANTOPRAZOLE SODIUM 20 MG/1
1 TABLET, DELAYED RELEASE ORAL
Qty: 0 | Refills: 0 | DISCHARGE
Start: 2020-03-12

## 2020-03-12 RX ORDER — MONTELUKAST 4 MG/1
1 TABLET, CHEWABLE ORAL
Qty: 0 | Refills: 0 | DISCHARGE

## 2020-03-12 RX ORDER — MONTELUKAST 4 MG/1
1 TABLET, CHEWABLE ORAL
Qty: 0 | Refills: 0 | DISCHARGE
Start: 2020-03-12

## 2020-03-12 RX ORDER — PANTOPRAZOLE SODIUM 20 MG/1
1 TABLET, DELAYED RELEASE ORAL
Qty: 30 | Refills: 0
Start: 2020-03-12 | End: 2020-04-10

## 2020-03-12 RX ORDER — BECLOMETHASONE DIPROPIONATE 40 UG/1
2 AEROSOL, METERED RESPIRATORY (INHALATION)
Qty: 0 | Refills: 0 | DISCHARGE
Start: 2020-03-12

## 2020-03-12 RX ORDER — LEVOTHYROXINE SODIUM 125 MCG
1 TABLET ORAL
Qty: 0 | Refills: 0 | DISCHARGE

## 2020-03-12 RX ADMIN — OXYCODONE HYDROCHLORIDE 10 MILLIGRAM(S): 5 TABLET ORAL at 10:52

## 2020-03-12 RX ADMIN — SODIUM CHLORIDE 3 MILLILITER(S): 9 INJECTION INTRAMUSCULAR; INTRAVENOUS; SUBCUTANEOUS at 05:14

## 2020-03-12 RX ADMIN — Medication 1 SPRAY(S): at 13:25

## 2020-03-12 RX ADMIN — PANTOPRAZOLE SODIUM 40 MILLIGRAM(S): 20 TABLET, DELAYED RELEASE ORAL at 05:18

## 2020-03-12 RX ADMIN — Medication 975 MILLIGRAM(S): at 13:43

## 2020-03-12 RX ADMIN — Medication 975 MILLIGRAM(S): at 05:18

## 2020-03-12 RX ADMIN — Medication 975 MILLIGRAM(S): at 05:19

## 2020-03-12 RX ADMIN — OXYCODONE HYDROCHLORIDE 10 MILLIGRAM(S): 5 TABLET ORAL at 09:52

## 2020-03-12 RX ADMIN — OXYCODONE HYDROCHLORIDE 10 MILLIGRAM(S): 5 TABLET ORAL at 04:17

## 2020-03-12 RX ADMIN — RIVAROXABAN 10 MILLIGRAM(S): KIT at 13:26

## 2020-03-12 RX ADMIN — OXYCODONE HYDROCHLORIDE 10 MILLIGRAM(S): 5 TABLET ORAL at 13:42

## 2020-03-12 RX ADMIN — Medication 50 MICROGRAM(S): at 05:19

## 2020-03-12 RX ADMIN — OXYCODONE HYDROCHLORIDE 10 MILLIGRAM(S): 5 TABLET ORAL at 14:42

## 2020-03-12 RX ADMIN — GABAPENTIN 300 MILLIGRAM(S): 400 CAPSULE ORAL at 13:27

## 2020-03-12 RX ADMIN — SODIUM CHLORIDE 3 MILLILITER(S): 9 INJECTION INTRAMUSCULAR; INTRAVENOUS; SUBCUTANEOUS at 13:37

## 2020-03-12 RX ADMIN — POLYETHYLENE GLYCOL 3350 17 GRAM(S): 17 POWDER, FOR SOLUTION ORAL at 09:51

## 2020-03-12 RX ADMIN — OXYCODONE HYDROCHLORIDE 10 MILLIGRAM(S): 5 TABLET ORAL at 03:18

## 2020-03-12 RX ADMIN — Medication 5 MILLIGRAM(S): at 05:19

## 2020-03-12 RX ADMIN — Medication 500 MILLIGRAM(S): at 05:19

## 2020-03-12 NOTE — PROGRESS NOTE ADULT - ASSESSMENT
69F s/p R TKA     Analgesia  DVT ppx  WBAT RLE  PT/OT  Incentive spirometry  DC planning to home possibly today

## 2020-03-12 NOTE — PROGRESS NOTE ADULT - SUBJECTIVE AND OBJECTIVE BOX
Orthopedics Progress Note:    This is a 68y/o Female s/p Right Total Knee Arthroplasty POD 2.  Pain is controlled. Pt feeling well. No nausea or vomiting. Pt was up OOB today with PT.    Vital Signs Last 24 Hrs  T(C): 36.8 (03-12-20 @ 11:09), Max: 37.6 (03-11-20 @ 23:40)  T(F): 98.3 (03-12-20 @ 11:09), Max: 99.7 (03-11-20 @ 23:40)  HR: 93 (03-12-20 @ 11:09) (93 - 104)  BP: 118/53 (03-12-20 @ 11:09) (118/53 - 159/62)  BP(mean): --  RR: 16 (03-12-20 @ 11:09) (16 - 16)  SpO2: 97% (03-12-20 @ 11:09) (95% - 97%)                        9.0    12.39 )-----------( 271      ( 12 Mar 2020 06:32 )             27.6     12 Mar 2020 06:32    135    |  101    |  16     ----------------------------<  125    4.2     |  31     |  0.73     Ca    9.0        12 Mar 2020 06:32      Exam:  NAD AAOx3.  Dressing clean, dry and intact; removed.  Surgical incision is clean, dry and intact with staples in place; no active drainage.   Foot pumps in place.  Calves are soft and nontender.  Moving all toes and ankle, +EHL/FHL/TA/GS.  SILT throughout.  DP and PT pulses 2+.    A/P:  Stable POD 2 Right Total Knee Arthroplasty  -Dressing changed using sterile technique; new 4x4/ABD/ACE wrap dressing placed   -Analgesia  -RLE elevation  -Ice application  -DVT PE ppx  -Incentive spirometry  -OOB PT WBAT RLE  -discharge planning

## 2020-03-12 NOTE — PROGRESS NOTE ADULT - SUBJECTIVE AND OBJECTIVE BOX
PCP- DR Chino Benavides    CC- s/p RT TKR    HPI:  68yo/F with PMH asthma, multiple seasonal allergies, hypothyroidism, hyperlipidemia, OA presented for elective RT TKR. Patient had pain in her RT knee affecting her ambulation, she failed outpatient treatment and required surgery. Medical consult called for postop medical management    PMH- as above  PSH- Lt humerus surgery, paraesophageal hernia repair, , RT breast surgery x2  Soc hx- passive smoker, denies alcohol  Fam hx- m had colon ca and HTN, f had hyperlipidemia    3/10/20-seen postop, doing good  3/11/20- in pain. Ambulated with PT  3/12/20 doing good, wants to go home    Review of system- All 10 systems reviewed and is as per HPI otherwise negative.     Vital Signs Last 24 Hrs  T(C): 36.8 (12 Mar 2020 11:09), Max: 37.6 (11 Mar 2020 23:40)  T(F): 98.3 (12 Mar 2020 11:09), Max: 99.7 (11 Mar 2020 23:40)  HR: 93 (12 Mar 2020 11:09) (93 - 104)  BP: 118/53 (12 Mar 2020 11:09) (118/53 - 159/62)  BP(mean): --  RR: 16 (12 Mar 2020 11:09) (16 - 16)  SpO2: 97% (12 Mar 2020 11:09) (95% - 97%)    LABS:                                         9.0    12.39 )-----------( 271      ( 12 Mar 2020 06:32 )             27.6     12 Mar 2020 06:32    135    |  101    |  16     ----------------------------<  125    4.2     |  31     |  0.73     Ca    9.0        12 Mar 2020 06:32    RADIOLOGY & ADDITIONAL TESTS:      PHYSICAL EXAM:  GENERAL: NAD, well-groomed, well-developed  HEAD:  Atraumatic, Normocephalic  EYES: EOMI, PERRLA, conjunctiva and sclera clear  HEENT: Moist mucous membranes  NECK: Supple, No JVD  NERVOUS SYSTEM:  Alert & Oriented X3, Motor Strength 5/5 B/L upper and lower extremities; DTRs 2+ intact and symmetric  CHEST/LUNG: Clear to auscultation bilaterally; No rales, rhonchi, wheezing, or rubs  HEART: Regular rate and rhythm; No murmurs, rubs, or gallops  ABDOMEN: Soft, Nontender, Nondistended; Bowel sounds present  GENITOURINARY- Voiding, no palpable bladder  EXTREMITIES:  2+ Peripheral Pulses, No clubbing, cyanosis, or edema  MUSCULOSKELTAL- RT knee dressing dry  SKIN-no rash, no lesion  CNS- alert, oriented X3, non focal       Daily Height in cm: 149.86 (10 Mar 2020 09:17)      MEDICATIONS  (STANDING):  acetaminophen   Tablet .. 975 milliGRAM(s) Oral every 8 hours  acetaminophen   Tablet .. 975 milliGRAM(s) Oral once  artificial  tears Solution 1 Drop(s) Both EYES four times a day  ascorbic acid 500 milliGRAM(s) Oral two times a day  benzonatate 100 milliGRAM(s) Oral three times a day  famotidine    Tablet 20 milliGRAM(s) Oral once  fluticasone propionate (50 MICROgram(s)/actuation) Nasal Spray - Peds 1 Spray(s) Alternating Nostrils daily  folic acid 1 milliGRAM(s) Oral daily  gabapentin 300 milliGRAM(s) Oral daily  lactated ringers. 1000 milliLiter(s) (75 mL/Hr) IV Continuous <Continuous>  levothyroxine 50 MICROGram(s) Oral daily  montelukast 10 milliGRAM(s) Oral at bedtime  multivitamin 1 Tablet(s) Oral daily  pantoprazole    Tablet 40 milliGRAM(s) Oral once  pantoprazole    Tablet 40 milliGRAM(s) Oral before breakfast  polyethylene glycol 3350 17 Gram(s) Oral daily  rivaroxaban 10 milliGRAM(s) Oral daily  sodium chloride 0.9% lock flush 3 milliLiter(s) IV Push every 8 hours  terbutaline 5 milliGRAM(s) Oral three times a day    MEDICATIONS  (PRN):  aluminum hydroxide/magnesium hydroxide/simethicone Suspension 30 milliLiter(s) Oral four times a day PRN Indigestion  bisacodyl Suppository 10 milliGRAM(s) Rectal daily PRN If no bowel movement by postoperative day #2  HYDROmorphone  Injectable 0.5 milliGRAM(s) SubCutaneous every 4 hours PRN Severe Pain (7 - 10)  ondansetron Injectable 4 milliGRAM(s) IV Push every 6 hours PRN Nausea and/or Vomiting  oxyCODONE    IR 5 milliGRAM(s) Oral every 4 hours PRN Mild Pain (1 - 3)  oxyCODONE    IR 10 milliGRAM(s) Oral every 4 hours PRN Moderate Pain (4 - 6)  senna 2 Tablet(s) Oral at bedtime PRN Constipation    Assessment/Plan  #S/p RT TKR  #Mild anemia acute blood loss postop  Ortho f/u appreciated  PT as tolerated  Monitor HH  Pain meds prn  AC by xarelto  Bowel regimen  Incentive spirometry    #Asthma  Cont home meds    #Hypothyroidism/hyperlipidemia  Cont home meds    #DVT proph- on xarelto    #Dispo- continue PT, likely home with home PT today. D/w pt and ortho team

## 2020-03-12 NOTE — PROGRESS NOTE ADULT - SUBJECTIVE AND OBJECTIVE BOX
HPI:      Patient is a 69y old  Female who presents with a chief complaint of R Knee pain Now on 2 North Right TKA (10 Mar 2020 10:29)      Consulted by     for VTE prophylaxis, risk stratification, and anticoagulation management.    PAST MEDICAL & SURGICAL HISTORY:  Back pain  Acute sinusitis  Constipation  Paraesophageal hiatal hernia: repair done  Heart murmur  Psoriasis  Bronchial asthma: recent broncohitis;  never intubated  Cataract, left  Hypothyroidism  Environmental and seasonal allergies  HLD (hyperlipidemia)  OA (osteoarthritis)  History of breast surgery: excision of papilloma right breast x 2  H/O shoulder surgery: left due to fracture  H/O: :   S/P repair of paraesophageal hernia          CrCl:65.2  EBL:150  BMI:27.8    Caprini VTE Risk Score:CAPRINI SCORE  AGE RELATED RISK FACTORS                                                       MOBILITY RELATED FACTORS  [ ] Age 41-60 years                                            (1 Point)                  [ x] Bed rest /restricted mobility                             (1 Point)  [x ] Age: 61-74 years                                           (2 Points)                [ ] Plaster cast                                                   (2 Points)  [ ] Age= 75 years                                              (3 Points)                 [ ] Bed bound for more than 72 hours                   (2 Points)    DISEASE RELATED RISK FACTORS                                               GENDER SPECIFIC FACTORS  [ ] Edema in the lower extremities                       (1 Point)           [ ] Pregnancy                                                            (1 Point)  [ ] Varicose veins                                               (1 Point)                  [ ] Post-partum < 6 weeks                                      (1 Point)             [x ] BMI > 25 Kg/m2                                            (1 Point)                  [ ] Hormonal therapy or oral contraception       (1 Point)                 [ ] Sepsis (in the previous month)                        (1 Point)             [ ] History of pregnancy complications                (1Point)  [ ] Pneumonia or serious lung disease                                             [ ] Unexplained or recurrent  (=/>3), premature                                 (In the previous month)                               (1 Point)                birth with toxemia or growth-restricted infant (1 Point)  [ ] Abnormal pulmonary function test            (1 Point)                                   SURGERY RELATED RISK FACTORS  [ ] Acute myocardial infarction                       (1 Point)                  [ ]  Section                                         (1 Point)  [ ] Congestive heart failure (in the previous month) (1 Point)   [ ] Minor surgery   lasting <45 minutes       (1 Point)   [ ] Inflammatory bowel disease                             (1 Point)          [ ] Arthroscopic surgery                                  (2 Points)  [ ] Central venous access                                    (2 Points)            [ ] General surgery lasting >45 minutes      (2 Points)       [ ] Stroke (in the previous month)                  (5 Points)            [x ] Elective major lower extremity arthroplasty (5 Points)                                   [  ] Malignancy (present or past include skin melanoma                                          but exclude  basal skin cell)    (2 points)                                      TRAUMA RELATED RISK FACTORS                HEMATOLOGY RELATED FACTORS                                  [ ] Fracture of the hip, pelvis, or leg                       (5 Points)  [ ] Prior episodes of VTE                                     (3 Points)          [ ] Acute spinal cord injury (in the previous month)  (5 Points)  [ ] Positive family history for VTE                         (3 Points)       [ ] Paralysis (less than 1 month)                          (5 Points)  [ ] Prothrombin 03835 A                                      (3 Points)         [ ] Multiple Trauma (within 1month)                 (5Points)                                                                                                                                                                [ ] Factor V Leiden                                          (3 Points)                                OTHER RISK FACTORS                          [ ] Lupus anticoagulants                                     (3 Points)                       [ ] BMI > 40                          (1 Point)                                                         [ ] Anticardiolipin antibodies                                (3 Points)                   [ ] Smoking                              (1Point)                                                [ ] High homocysteine in the blood                      (3 Points)                [  ] Diabetes requiring insulin (1point)                         [ ] Other congenital or acquired thrombophilia       (3 Points)          [  ] Chemotherapy                   (1 Point)  [ ] Heparin induced thrombocytopenia                  (3 Points)             [  ] Blood Transfusion                (1 point)                                                                                                             Total Score [  9       ]                                                                                                                                                                                                                                                                                                                                                                                                                                         IMPROVE Bleeding Risk Score:1.5    Torniq time:     Time In: 11:18      Falls Risk:   High (  )  Mod ( x )  Low (  )      FAMILY HISTORY:  FH: hyperlipidemia: mother and father  FH: hypertension: mother  FH: colon cancer: mother    Denies any personal or familial history of clotting or bleeding disorders.    Allergies    Celebrex (Other)  codeine (Angioedema)  Keflex (Unknown)  penicillins (Unknown)  sulfa drugs (Unknown)    Intolerances        REVIEW OF SYSTEMS    (  )Fever	     (  )Constipation	(  )SOB				(  )Headache	(  )Dysuria  (  )Chills	     (  )Melena	(  )Dyspnea present on exertion	                    (  )Dizziness                    (  )Polyuria  (  )Nausea	     (  )Hematochezia	(  )Cough			                    (  )Syncope   	(  )Hematuria  (  )Vomiting    (  )Chest Pain	(  )Wheezing			(  )Weakness  (  )Diarrhea     (  )Palpitations	(  )Anorexia			( x )joint pain        All  other review of systems negative: Yes  Vital Signs Last 24 Hrs    Vital Signs Last 24 Hrs  T(C): 36.8 (20 @ 11:09), Max: 37.6 (20 @ 23:40)  T(F): 98.3 (20 @ 11:09), Max: 99.7 (20 @ 23:40)  HR: 93 (20 @ 11:09) (93 - 104)  BP: 118/53 (20 @ 11:09) (118/53 - 159/62)  BP(mean): --  RR: 16 (20 @ 11:09) (16 - 16)  SpO2: 97% (20 @ 11:09) (95% - 97%)      3/11/2020: Pt seen at bedside, sitting in the chair, no concerns regarding AC script sent, co pay discussed, Literature for Xarelto provided.  3/12:  seen at bedside, no concerns    PHYSICAL EXAM:    Constitutional: Appears Well    Neurological: A& O x 3    Skin: Warm    Respiratory and Thorax: normal effort; Breath sounds: normal; No rales/wheezing/rhonchi  	  Cardiovascular: S1, S2, regular, NMBR	    Gastrointestinal: BS + x 4Q, nontender	    Genitourinary:  Bladder nondistended, nontender    Musculoskeletal:   General Right:   + muscle/joint tenderness,   normal tone, no joint swelling,   ROM: limited	    General Left:   no muscle/joint tenderness,   normal tone, no joint swelling,   ROM: full      Knee:  Right: Incision: ; Dressing CDI;                   Lower extrems:   Right: no calf tenderness              negative lindsay's sign               + pedal pulses    Left:   no calf tenderness              negative lindsay's sign               + pedal pulses                              9.0    12.39 )-----------( 271      ( 12 Mar 2020 06:32 )             27.6       03-12    135  |  101  |  16  ----------------------------<  125<H>  4.2   |  31  |  0.73    Ca    9.0      12 Mar 2020 06:32                                    10.3   14.82 )-----------( 301      ( 11 Mar 2020 06:26 )             28.8       03-11    135  |  102  |  20  ----------------------------<  147<H>  4.0   |  28  |  0.82    Ca    9.2      11 Mar 2020 06:26                          11.4   9.25  )-----------( 324      ( 10 Mar 2020 13:14 )             32.2       03-10    139  |  107  |  17  ----------------------------<  132<H>  4.8   |  27  |  0.77    Ca    9.4      10 Mar 2020 13:14        MEDICATIONS  (STANDING):  acetaminophen   Tablet .. 975 milliGRAM(s) Oral every 8 hours  acetaminophen   Tablet .. 975 milliGRAM(s) Oral once  ascorbic acid 500 milliGRAM(s) Oral two times a day  benzonatate 100 milliGRAM(s) Oral three times a day  famotidine    Tablet 20 milliGRAM(s) Oral once  fluticasone propionate (50 MICROgram(s)/actuation) Nasal Spray - Peds 1 Spray(s) Alternating Nostrils daily  folic acid 1 milliGRAM(s) Oral daily  gabapentin 300 milliGRAM(s) Oral daily  lactated ringers. 1000 milliLiter(s) (75 mL/Hr) IV Continuous <Continuous>  levothyroxine 50 MICROGram(s) Oral daily  montelukast 10 milliGRAM(s) Oral daily  multivitamin 1 Tablet(s) Oral daily  pantoprazole    Tablet 40 milliGRAM(s) Oral once  pantoprazole    Tablet 40 milliGRAM(s) Oral before breakfast  polyethylene glycol 3350 17 Gram(s) Oral daily  polyvinyl alcohol 1.4%/povidone 0.6% Ophthalmic Solution - Peds 1 Drop(s) Both EYES four times a day  rivaroxaban 10 milliGRAM(s) Oral daily  sodium chloride 0.9% lock flush 3 milliLiter(s) IV Push every 8 hours  terbutaline 10 milliGRAM(s) Oral two times a day  vancomycin  IVPB 1000 milliGRAM(s) IV Intermittent every 12 hours          DVT Prophylaxis:  LMWH                   (  )  Heparin SQ           (  )  Coumadin             (  )  Xarelto                  ( x )  Eliquis                   (  )  Venodynes           (x  )  Ambulation          ( x )  UFH                       (  )  Contraindicated  (  )  EC ASPIRIN       (  )

## 2020-03-12 NOTE — PROGRESS NOTE ADULT - SUBJECTIVE AND OBJECTIVE BOX
Pt S/E at bedside, no acute events overnight, pain controlled. Feels well, no complaints this morning.    Vital Signs Last 24 Hrs  T(C): 37.2 (12 Mar 2020 05:00), Max: 37.6 (11 Mar 2020 23:40)  T(F): 98.9 (12 Mar 2020 05:00), Max: 99.7 (11 Mar 2020 23:40)  HR: 104 (12 Mar 2020 05:00) (94 - 104)  BP: 142/52 (12 Mar 2020 05:00) (125/59 - 159/62)  BP(mean): --  RR: 16 (12 Mar 2020 05:00) (16 - 16)  SpO2: 96% (12 Mar 2020 05:00) (95% - 97%)    Gen: NAD, AAOx3    Right Lower Extremity:  Dressing clean dry intact  +EHL/FHL/TA/GS  SILT L3-S1  +DP/PT Pulses  Compartments soft  No calf TTP B/L

## 2020-03-18 DIAGNOSIS — Z91.018 ALLERGY TO OTHER FOODS: ICD-10-CM

## 2020-03-18 DIAGNOSIS — J45.909 UNSPECIFIED ASTHMA, UNCOMPLICATED: ICD-10-CM

## 2020-03-18 DIAGNOSIS — E78.5 HYPERLIPIDEMIA, UNSPECIFIED: ICD-10-CM

## 2020-03-18 DIAGNOSIS — Z88.8 ALLERGY STATUS TO OTHER DRUGS, MEDICAMENTS AND BIOLOGICAL SUBSTANCES STATUS: ICD-10-CM

## 2020-03-18 DIAGNOSIS — E03.9 HYPOTHYROIDISM, UNSPECIFIED: ICD-10-CM

## 2020-03-18 DIAGNOSIS — Z88.2 ALLERGY STATUS TO SULFONAMIDES: ICD-10-CM

## 2020-03-18 DIAGNOSIS — Z88.0 ALLERGY STATUS TO PENICILLIN: ICD-10-CM

## 2020-03-18 DIAGNOSIS — D62 ACUTE POSTHEMORRHAGIC ANEMIA: ICD-10-CM

## 2020-03-18 DIAGNOSIS — M17.11 UNILATERAL PRIMARY OSTEOARTHRITIS, RIGHT KNEE: ICD-10-CM

## 2020-04-02 ENCOUNTER — RX RENEWAL (OUTPATIENT)
Age: 70
End: 2020-04-02

## 2020-06-08 NOTE — H&P PST ADULT - CARDIOVASCULAR
Outpatient Medications Marked as Taking for the 6/8/20 encounter (Refill) with Alden Brady, DO   Medication Sig Dispense Refill   • buPROPion (WELLBUTRIN SR) 150 MG 12 hr tablet Take 1 tablet by mouth 2 times daily. 30 tablet 4   Patient states he would like a 30 day supply so he is not going to the pharmacy every 2 weeks.      Ok to leave detailed Message: Yes  Informed caller of refill policy- 24-48 hours: Yes  No call back needed unless nurse has questions.     Pharmacy: [facility] #[id number] - [place] [id number]  [phone]   details… detailed exam

## 2020-08-12 ENCOUNTER — TRANSCRIPTION ENCOUNTER (OUTPATIENT)
Age: 70
End: 2020-08-12

## 2022-06-05 ENCOUNTER — EMERGENCY (EMERGENCY)
Facility: HOSPITAL | Age: 72
LOS: 1 days | Discharge: HOME | End: 2022-06-05
Attending: EMERGENCY MEDICINE | Admitting: EMERGENCY MEDICINE
Payer: MEDICARE

## 2022-06-05 VITALS
HEIGHT: 59 IN | OXYGEN SATURATION: 98 % | HEART RATE: 86 BPM | WEIGHT: 138.89 LBS | RESPIRATION RATE: 15 BRPM | SYSTOLIC BLOOD PRESSURE: 176 MMHG | DIASTOLIC BLOOD PRESSURE: 83 MMHG | TEMPERATURE: 98 F

## 2022-06-05 DIAGNOSIS — Z98.891 HISTORY OF UTERINE SCAR FROM PREVIOUS SURGERY: Chronic | ICD-10-CM

## 2022-06-05 DIAGNOSIS — Z98.890 OTHER SPECIFIED POSTPROCEDURAL STATES: Chronic | ICD-10-CM

## 2022-06-05 PROCEDURE — 99283 EMERGENCY DEPT VISIT LOW MDM: CPT | Mod: FS

## 2022-06-05 PROCEDURE — 12002 RPR S/N/AX/GEN/TRNK2.6-7.5CM: CPT

## 2022-06-05 PROCEDURE — 99282 EMERGENCY DEPT VISIT SF MDM: CPT

## 2022-06-05 RX ORDER — LIDOCAINE HCL 20 MG/ML
10 VIAL (ML) INJECTION ONCE
Refills: 0 | Status: DISCONTINUED | OUTPATIENT
Start: 2022-06-05 | End: 2022-06-09

## 2022-06-05 NOTE — ED ADULT NURSE NOTE - OBJECTIVE STATEMENT
received pt stable alert oriented x4 no distress c/o lt hand lacerations  pt sustained a cut while opening a can dog food pt with no bleeding at present and up to date with tetanus awaiting sutures

## 2022-06-05 NOTE — ED PROVIDER NOTE - NSICDXPASTMEDICALHX_GEN_ALL_CORE_FT
PAST MEDICAL HISTORY:  Acute sinusitis     Back pain     Bronchial asthma recent broncohitis;  never intubated    Cataract, left     Constipation     Environmental and seasonal allergies     Heart murmur     HLD (hyperlipidemia)     Hypothyroidism     OA (osteoarthritis)     Paraesophageal hiatal hernia repair done    Psoriasis

## 2022-06-05 NOTE — ED PROVIDER NOTE - CARE PROVIDER_API CALL
Sachin Barry)  Plastic Surgery  [address] [facility], [address]  [place] [address]  Phone: [phone]  Fax: [phone]  Follow Up Time: 1-3 Days

## 2022-06-05 NOTE — ED PROVIDER NOTE - NSFOLLOWUPINSTRUCTIONS_ED_ALL_ED_FT
Laceration    A laceration is a cut that goes through all of the layers of the skin and into the tissue that is right under the skin. Some lacerations heal on their own. Others need to be closed with skin adhesive strips, skin glue, stitches (sutures), or staples. Proper laceration care minimizes the risk of infection and helps the laceration to heal better.  If non-absorbable stitches or staples have been placed, they must be taken out within the time frame instructed by your healthcare provider.    SEEK IMMEDIATE MEDICAL CARE IF YOU HAVE ANY OF THE FOLLOWING SYMPTOMS: swelling around the wound, worsening pain, drainage from the wound, red streaking going away from your wound, inability to move finger or toe near the laceration, or discoloration of skin near the laceration.     1) Keep wound clean and dry for first 24 hours, then you can wash area gently with soap and water  2) Apply bacitracin 1-2 times a day  3) Return in 10-12 days for suture removal

## 2022-06-05 NOTE — ED PROVIDER NOTE - OBJECTIVE STATEMENT
70 yo with with h/o hld, hypothyroidism, hld, oa presents to the ED c/o laceration to left hand occurred while opening dog food can prior to arrival. UTD with tetanus. no additional complaints. Denies fever, chills, chest pain, sob, abd pain, UE weakness or paresthesias.

## 2022-06-05 NOTE — ED PROVIDER NOTE - CLINICAL SUMMARY MEDICAL DECISION MAKING FREE TEXT BOX
DT: I have personally performed a face to face diagnostic evaluation on this patient.  I have reviewed the PA's note and agree with the history, exam, and plan of care, except as noted.  History and Exam by me shows left hand lac today while trying to open a can of dog food .  Patient is NAD.  A n O x 3. Head NC/AT. Left hand - about 3 cm lac on palmar surface c no motor or sensory deficits.  lac repaired by pa .

## 2022-06-05 NOTE — ED PROVIDER NOTE - PATIENT PORTAL LINK FT
You can access the FollowMyHealth Patient Portal offered by [facility] by registering at the following website: [email or web address]. By joining [facility]’s FollowMyHealth portal, you will also be able to view your health information using other applications (apps) compatible with our system.

## 2022-06-06 PROBLEM — M54.9 DORSALGIA, UNSPECIFIED: Chronic | Status: ACTIVE | Noted: 2020-03-04

## 2022-06-06 PROBLEM — J01.90 ACUTE SINUSITIS, UNSPECIFIED: Chronic | Status: ACTIVE | Noted: 2020-03-04

## 2022-06-06 PROBLEM — K44.9 DIAPHRAGMATIC HERNIA WITHOUT OBSTRUCTION OR GANGRENE: Chronic | Status: ACTIVE | Noted: 2020-01-08

## 2022-06-06 PROBLEM — J45.909 UNSPECIFIED ASTHMA, UNCOMPLICATED: Chronic | Status: ACTIVE | Noted: 2020-01-08

## 2022-10-04 NOTE — H&P PST ADULT - GUM GEN PE MLT EXAM PC
Nephrology ([facility]) Progress Note    CC: hypernatremia and hypokalemia    Subjective:    HPI:  Breathing comfortably. MRDD. Labs noted . ROS:  In bed. Unresponsive   PMFSH:  medications reviewed. Objective:  Blood pressure (!) 87/55, pulse (!) 107, temperature 97.7 °F (36.5 °C), temperature source Oral, resp. rate 18, height 5' (1.524 m), weight 135 lb 2.3 oz (61.3 kg), SpO2 98 %. Intake/Output Summary (Last 24 hours) at 10/4/2022 1209  Last data filed at 10/4/2022 1133  Gross per 24 hour   Intake 0 ml   Output 1322 ml   Net -1322 ml       General:  resting   Chest:  CTAB  CVS:  RRR  Abdominal:  NTND, soft, +BS  Extremities:  no edema  Skin:  no rash    Labs:  Renal panel:  Lab Results   Component Value Date/Time     10/04/2022 06:08 AM    K 3.6 10/04/2022 06:08 AM    CO2 22 10/04/2022 06:08 AM    BUN 4 (L) 10/04/2022 06:08 AM    CREATININE <0.5 (L) 10/04/2022 06:08 AM    CALCIUM 8.5 10/04/2022 06:08 AM    PHOS 2.4 (L) 10/04/2022 06:08 AM    MG 1.70 (L) 10/04/2022 06:08 AM     CBC:  Lab Results   Component Value Date/Time    WBC 9.0 10/02/2022 08:21 AM    HGB 12.0 10/02/2022 08:21 AM    HCT 37.7 10/02/2022 08:21 AM     10/02/2022 08:21 AM       Assessment/Plan:  Reviewed old records and labs. 1) hypernatremia   -Na better    - free water deficit resolving .         - On  D5W to 50 ml/hr    2) hypokalemia   - supplement prn   - magnesium level okay    3) AMS   - possibly due to hypernatremia, but it should have improved given improvement in Na, and she should not be symptomatic at this level   - neurology consulted   - MRI and MRA pending (patient can receive gadolinium)    4) FEN   - will supp K/Mg . Phos noted.      5) MRDD        Zi Ribeiro MD,FACP patient refused

## 2023-01-04 ENCOUNTER — APPOINTMENT (OUTPATIENT)
Dept: GASTROENTEROLOGY | Facility: CLINIC | Age: 73
End: 2023-01-04
Payer: MEDICARE

## 2023-01-04 ENCOUNTER — NON-APPOINTMENT (OUTPATIENT)
Age: 73
End: 2023-01-04

## 2023-01-04 PROCEDURE — 99203 OFFICE O/P NEW LOW 30 MIN: CPT

## 2023-01-04 NOTE — CONSULT LETTER
[Dear  ___] : Dear  [unfilled], [Consult Letter:] : I had the pleasure of evaluating your patient, [unfilled]. [Please see my note below.] : Please see my note below. [Consult Closing:] : Thank you very much for allowing me to participate in the care of this patient.  If you have any questions, please do not hesitate to contact me. [Sincerely,] : Sincerely, [FreeTextEntry2] : Shelby Benavides Md\par Umberto Lee\par [address] [address] [phone] \par  [FreeTextEntry3] : Murphy Michelle MD\par

## 2023-01-04 NOTE — PHYSICAL EXAM
[Alert] : alert [Normal Voice/Communication] : normal voice/communication [Healthy Appearing] : healthy appearing [No Acute Distress] : no acute distress [Sclera] : the sclera and conjunctiva were normal [Hearing Threshold Finger Rub Not Johnson] : hearing was normal [Normal Appearance] : the appearance of the neck was normal [No Neck Mass] : no neck mass was observed [No Respiratory Distress] : no respiratory distress [No Acc Muscle Use] : no accessory muscle use [Respiration, Rhythm And Depth] : normal respiratory rhythm and effort [Heart Rate And Rhythm] : heart rate was normal and rhythm regular [None] : no edema [Bowel Sounds] : normal bowel sounds [Abdomen Tenderness] : non-tender [No Masses] : no abdominal mass palpated [Abdomen Soft] : soft [] : no hepatosplenomegaly [Abnormal Walk] : normal gait [Normal Color / Pigmentation] : normal skin color and pigmentation [No Focal Deficits] : no focal deficits [Oriented To Time, Place, And Person] : oriented to person, place, and time [de-identified] : Pleasant 72-year-old female, no acute distress

## 2023-01-04 NOTE — ASSESSMENT
[FreeTextEntry1] : Impression\par \par Family history, mother having had colon cancer, due for colonoscopy screening\par \par History of asthma, seems to be doing well on Breo, last on steroids in November\par \par No pulmonologist, primary care doctor manages her asthma apparently very well\par \par Request for colon cancer screening\par \par Suggest\par \par Anesthesia clearance\par \par I have asked for clearance from the primary care provider\par \par Agree with colonoscopy\par \par The patient has Sutabs, I gave her proper instructions\par \par The laxative, or its risks benefits and alternatives have been thoroughly reviewed with the patient in great detail. The laxative instructions have been reviewed in great detail with the patient.\par \par Risks/benefits:\par The procedure, the risks and benefits and alternatives have been reviewed in great detail with the patient.  Risks including, but not limited to sedation such as cardiac and pulmonary compromise, the procedure itself such as bleeding requiring hospitalization, transfusion, surgery, temporary or permanent colostomy.  Perforation or puncture of the requiring hospitalization, surgery, temporary colostomy.\par It has been explained to the patient that though colonoscopy is thought to be the best screening exam for colon cancer and polyps, no screening exam can find all colon polyps or cancers.  \par The patient expresses understanding of the procedure and consents to undergoing the procedure.\par

## 2023-01-04 NOTE — HISTORY OF PRESENT ILLNESS
[FreeTextEntry1] : Jan 04, 2023 \par \par IKER VENCES MD\par \par Pleasant 72-year-old female\par \par Mother had colon cancer\par \par And overdue for screening colonoscopy\par \par She has asthma, doing well on Breo\par \par She had prednisone in November\par \par She does not have a pulmonologist, she is managed by her primary care physician\par \par Ms. LAURA VINCENT 72 year is referred for colon cancer screening.  The patient denies any change in bowel movements, blood per rectum, abdominal, pelvic or rectal discomfort.   \par \par There is no family history of  other gastrointestinal cancers.\par \par The patient denies any unexplained weight loss, fever chills or night sweats.\par \par There is no significant cardiac issues.\par \par No complaints of chest pain, shortness of breath, palpitations, cough.\par \par The patient is feeling quite well.\par \par The patient is on no significant anticoagulant therapy or anti platelet therapy. \par \par No adverse reaction to anesthesia in the past.\par \par

## 2023-02-21 DIAGNOSIS — L30.4 ERYTHEMA INTERTRIGO: ICD-10-CM

## 2023-04-14 ENCOUNTER — APPOINTMENT (OUTPATIENT)
Dept: OBGYN | Facility: CLINIC | Age: 73
End: 2023-04-14

## 2023-04-28 ENCOUNTER — APPOINTMENT (OUTPATIENT)
Dept: OBGYN | Facility: CLINIC | Age: 73
End: 2023-04-28
Payer: MEDICARE

## 2023-04-28 VITALS
RESPIRATION RATE: 16 BRPM | OXYGEN SATURATION: 98 % | HEART RATE: 88 BPM | HEIGHT: 60 IN | SYSTOLIC BLOOD PRESSURE: 126 MMHG | DIASTOLIC BLOOD PRESSURE: 72 MMHG | WEIGHT: 138 LBS | BODY MASS INDEX: 27.09 KG/M2

## 2023-04-28 PROCEDURE — G0101: CPT

## 2023-04-28 PROCEDURE — 82270 OCCULT BLOOD FECES: CPT

## 2023-04-28 NOTE — HISTORY OF PRESENT ILLNESS
[N] : Patient is not sexually active [LMP unknown] : LMP unknown [unknown] : Patient is unsure of the date of her LMP [FreeTextEntry1] : pt is here for annual.\par The patient presents today for a routine GYN exam.  She offers no complaints.  We reviewed together in detail her past medical and surgical histories, allergies and medication usage, social and family history.   All questions were answered in easy to understand language.\par  [Mammogramdate] : 04/22 [PapSmeardate] : 04/22 [BoneDensityDate] : 04/22 [ColonoscopyDate] : 04/23

## 2023-04-28 NOTE — PLAN
[FreeTextEntry1] : I have spent 40 minutes of time on this encounter.  Greater than 50% of the face-to-face encounter time was spent on counseling and/or coordination of care for examination findings, differential, testing, management and planning. 10 minutes were allotted to discussing the depression screening.\par 1)  Self breast exam instructions, calcium supplementation discussed with the patient.\par 2)  Mammography, lipid profile assessment, TSH screening, fasting glucose testing, colonoscopy screening were discussed with the patient.  Vitamin D supplementation\par 3)  Maintain healthy weight.\par 4)  Regular health maintenance with PCP.\par 5)  Remain tobacco free.\par 6)  Limit alcohol intake to less than 5 drinks per week.\par 7)  Osteoporosis screening.\par 8)  Annual cholesterol screening.\par 9)  Annual influenza vaccine.The importance of routine physical activity was reviewed and a goal of 150 minutes of moderately vigorous exercise per week was endorsed.\par Yearly breast cancer screening with no current clinical or radiographic concerns.  The patient was reminded regarding future well breast and general healthcare, breast cancer risk reduction, the importance of self-examination and the need for follow up.   She was again reminded of the need to take Vit D3 2500  IU daily or to keep a Vit D level above 30, and Tumeric 1000 mg daily with Black Pepper.  Plan continued yearly imaging and breast follow up, sooner as needed.  I counseled the patient on current recommendations to reduce breast cancer risk including but not inclusive to regular exercise 20-30 minutes 3-4 times a week, low fat diet, limiting alcohol consumption, maintenance of ideal body weight, yearly imaging and self breast awareness.  Questions answered.  I encouraged in light of Covid 19, social distancing, frequent hand washing and precautions to stay healthy.\par \par The patient's diagnosis of osteoporosis was discussed.  The patient asked questions about Fosamax versus alternatives.  I did recommend follow-up with rheum or endocrinology for the specifics

## 2023-04-28 NOTE — PHYSICAL EXAM
[Appropriately responsive] : appropriately responsive [Alert] : alert [No Acute Distress] : no acute distress [No Lymphadenopathy] : no lymphadenopathy [No Murmurs] : no murmurs [Soft] : soft [Non-tender] : non-tender [Non-distended] : non-distended [No HSM] : No HSM [No Lesions] : no lesions [No Mass] : no mass [Oriented x3] : oriented x3 [Examination Of The Breasts] : a normal appearance [No Masses] : no breast masses were palpable [Labia Majora] : normal [Labia Minora] : normal [Normal] : normal [Uterine Adnexae] : normal [FreeTextEntry9] : deferred [FreeTextEntry8] : Normal exam

## 2023-04-28 NOTE — COUNSELING
[Nutrition/ Exercise/ Weight Management] : nutrition, exercise, weight management [Body Image] : body image [Vitamins/Supplements] : vitamins/supplements [Breast Self Exam] : breast self exam [Sunscreen] : sunscreen [Bladder Hygiene] : bladder hygiene

## 2023-12-04 ENCOUNTER — APPOINTMENT (OUTPATIENT)
Dept: PULMONOLOGY | Facility: CLINIC | Age: 73
End: 2023-12-04
Payer: MEDICARE

## 2023-12-04 VITALS
HEIGHT: 60 IN | RESPIRATION RATE: 17 BRPM | HEART RATE: 87 BPM | BODY MASS INDEX: 27.09 KG/M2 | DIASTOLIC BLOOD PRESSURE: 94 MMHG | OXYGEN SATURATION: 96 % | SYSTOLIC BLOOD PRESSURE: 173 MMHG | WEIGHT: 138 LBS

## 2023-12-04 DIAGNOSIS — J45.909 UNSPECIFIED ASTHMA, UNCOMPLICATED: ICD-10-CM

## 2023-12-04 PROCEDURE — 99204 OFFICE O/P NEW MOD 45 MIN: CPT

## 2023-12-05 ENCOUNTER — NON-APPOINTMENT (OUTPATIENT)
Age: 73
End: 2023-12-05

## 2023-12-20 ENCOUNTER — APPOINTMENT (OUTPATIENT)
Dept: THORACIC SURGERY | Facility: CLINIC | Age: 73
End: 2023-12-20
Payer: MEDICARE

## 2023-12-20 VITALS
WEIGHT: 138 LBS | OXYGEN SATURATION: 98 % | HEART RATE: 80 BPM | BODY MASS INDEX: 27.09 KG/M2 | DIASTOLIC BLOOD PRESSURE: 80 MMHG | HEIGHT: 60 IN | SYSTOLIC BLOOD PRESSURE: 172 MMHG

## 2023-12-20 PROCEDURE — 99205 OFFICE O/P NEW HI 60 MIN: CPT

## 2023-12-20 NOTE — HISTORY OF PRESENT ILLNESS
[FreeTextEntry1] : Ms. LAURA VINCENT, 73 year old female, never smoker (+2nd smoke exposure), w/ hx of HLD, asthma, depression, who c/o cough x 1 yr, PCP Dr. Benavides referred patient to Pulm for further evaluation, sent for a CT chest which showed a new YURI nodule. Referred by Dr. Claus Almazan (Pulm) for evaluation.   PFTs on 10/24/23: FVC 1.96 L (81%), FEV1 1.36 L (71%), DLCO 59%  CT chest on 11/10/2023: (ZP) -  2.0 x 1.0 cm nodular consolidation anteromedial left upper lobe is new since 2007. Infectious, inflammatory and neoplastic etiologies are considered. Recommend direct comparison with any more recent prior studies. If these are not available, a follow-up CT the chest is recommended in 4-6 weeks after medical therapy - New reticular nodular densities inferior right upper lobe 4:158 - Mild bilateral bronchial wall thickening - 2-3 mm bilateral calcified granulomas  PET/CT on 11/18/2023: (ZP) - There is no focal abnormal FDG activity corresponding with a anterior medial aspect left upper lobe opacity of body and the mediastinum 1.9 x 0.9 cm, series 3 image 71). This appears unchanged from the CT chest of 11/10/2023.  Nodify blood test on 11/17/23: 87% genestrat blood tests on 11/27/23: negative for EGFR, ALK, KRAS, BRAF  Patient was evaluated by Dr. Orr on 12/04/2023, IR biopsy vs FNA (very high risk) vs VATS resection.  Patient is here today for CT surgery consultation. Admits to on/off cough.

## 2023-12-20 NOTE — PHYSICAL EXAM
Go for blood tests as directed. Your doctor will do lab tests at regular visits to monitor the effects of this medicine. Please follow up with your doctor and keep your health care provider appointments. [Fully active, able to carry on all pre-disease performance without restriction] : Status 0 - Fully active, able to carry on all pre-disease performance without restriction [General Appearance - Alert] : alert [General Appearance - In No Acute Distress] : in no acute distress [Sclera] : the sclera and conjunctiva were normal [PERRL With Normal Accommodation] : pupils were equal in size, round, and reactive to light [Extraocular Movements] : extraocular movements were intact [Outer Ear] : the ears and nose were normal in appearance [Oropharynx] : the oropharynx was normal [Neck Appearance] : the appearance of the neck was normal [Neck Cervical Mass (___cm)] : no neck mass was observed [Jugular Venous Distention Increased] : there was no jugular-venous distention [Thyroid Diffuse Enlargement] : the thyroid was not enlarged [Thyroid Nodule] : there were no palpable thyroid nodules [Auscultation Breath Sounds / Voice Sounds] : lungs were clear to auscultation bilaterally [Heart Rate And Rhythm] : heart rate was normal and rhythm regular [Heart Sounds] : normal S1 and S2 [Heart Sounds Gallop] : no gallops [Murmurs] : no murmurs [Heart Sounds Pericardial Friction Rub] : no pericardial rub [Examination Of The Chest] : the chest was normal in appearance [Chest Visual Inspection Thoracic Asymmetry] : no chest asymmetry [Diminished Respiratory Excursion] : normal chest expansion [2+] : left 2+ [Bowel Sounds] : normal bowel sounds [Abdomen Soft] : soft [Abdomen Tenderness] : non-tender [Abdomen Mass (___ Cm)] : no abdominal mass palpated [Cervical Lymph Nodes Enlarged Posterior Bilaterally] : posterior cervical [Cervical Lymph Nodes Enlarged Anterior Bilaterally] : anterior cervical [Supraclavicular Lymph Nodes Enlarged Bilaterally] : supraclavicular [No CVA Tenderness] : no ~M costovertebral angle tenderness [No Spinal Tenderness] : no spinal tenderness [Nail Clubbing] : no clubbing  or cyanosis of the fingernails [Abnormal Walk] : normal gait [Musculoskeletal - Swelling] : no joint swelling seen [Motor Tone] : muscle strength and tone were normal [Skin Color & Pigmentation] : normal skin color and pigmentation [Skin Turgor] : normal skin turgor [] : no rash [Deep Tendon Reflexes (DTR)] : deep tendon reflexes were 2+ and symmetric [Sensation] : the sensory exam was normal to light touch and pinprick [No Focal Deficits] : no focal deficits [Oriented To Time, Place, And Person] : oriented to person, place, and time [Impaired Insight] : insight and judgment were intact [Affect] : the affect was normal

## 2023-12-20 NOTE — ASSESSMENT
[FreeTextEntry1] : Ms. LAURA VINCENT, 73-year-old female, never smoker (+2nd smoke exposure), w/ hx of HLD, asthma, depression, who c/o cough x 1 yr, PCP Dr. Benavides referred patient to Pulmonary for further evaluation, sent for a CT chest which showed a new YURI nodule. Referred by Dr. Claus Almazan for evaluation. I feel this lesion is likely benign given its appearance on PET/CT however malignancy is a possibility. Given its location Eliceo bronchoscopy and CT guided biopsies are not ideal. I feel the patient should have the lesion resected with possible lymph node dissection. I have explained this at length with the patient including all of the risks, benefits and alternative of the procedure not limited to bleeding, infection, shortness of breath, oxygen dependance, benign disease, lymphatic leak, nerve injury and pain. I will plan for the procedure once she is cleared. I have answered all of her questions and she understands the importance of follow up.    I, Dr. Torey Rosenthal, personally performed the evaluation and management (E/M) services for this new patient. That E/M includes conducting the initial examination, assessing all conditions, and establishing the plan of care. Today, My ACP, Yoel Lowe, was here to observe my evaluation and management services for this patient to be followed going forward.

## 2023-12-20 NOTE — DATA REVIEWED
[FreeTextEntry1] : I have independently reviewed patient's CT on 11/10/2023 and PET/CT on 11/18/2023

## 2023-12-24 PROBLEM — J45.909 ASTHMA, UNSPECIFIED ASTHMA SEVERITY, UNSPECIFIED WHETHER COMPLICATED, UNSPECIFIED WHETHER PERSISTENT: Status: ACTIVE | Noted: 2019-09-20

## 2023-12-24 NOTE — CONSULT LETTER
[Dear  ___] : Dear  [unfilled], [Consult Letter:] : I had the pleasure of evaluating your patient, [unfilled]. [Please see my note below.] : Please see my note below. [Consult Closing:] : Thank you very much for allowing me to participate in the care of this patient.  If you have any questions, please do not hesitate to contact me. [Sincerely,] : Sincerely, [FreeTextEntry3] : Tom Orr MD Director of Interventional Pulmonology & Bronchoscopy .  Division of Pulmonary, Critical Care & Sleep Medicine [facility] at [facility].

## 2023-12-24 NOTE — REASON FOR VISIT
[Consultation] : a consultation [TextBox_44] : IP consultation for lung nodule.  [TextBox_13] : Dr. Almazan

## 2023-12-24 NOTE — ASSESSMENT
[FreeTextEntry1] : 73F with asthma referred to IP for lung nodule evaluation.  This past month, she had a CT chest showing a YURI 2x1cm nodule, with a subesequent PET/CT showing non-avid nodule. Nodify serum testing showing 87% chance of malignancy (high). Nodule very peripheral and not in ideal location for navigation assisted bronchoscopy with biopsy.   - will contact IR to see if TTBx possibility - will refer to Thoracic for possible VATS eval for wedge bx if IR not an option give challenging location.  - will need for asthma exac to resolve prior to procedure  D/w Dr Dominga Ryan MD PCCM Fellow

## 2023-12-24 NOTE — HISTORY OF PRESENT ILLNESS
[TextBox_4] : Interventional Pulmonology Consultation/Visit Note  73F with asthma referred to IP for lung nodule evaluation.  This past month, she had a CT chest showing a YURI 2x1cm nodule, with a subesequent PET/CT showing non-avid nodule. Nodify serum testing showing 87% chance of malignancy (high). Currently in asthma exacerbation and slightly SOB. No smoking, drinking, drug hx.

## 2023-12-29 ENCOUNTER — APPOINTMENT (OUTPATIENT)
Dept: INTERVENTIONAL RADIOLOGY/VASCULAR | Facility: CLINIC | Age: 73
End: 2023-12-29

## 2024-02-02 NOTE — PATIENT PROFILE ADULT - DO YOU FEEL THREATENED BY OTHERS?
Your symptoms may be partially caused by the use of energy drinks.  We recommend that you discontinue their use.  You may experience some headaches after you stop them.  These headaches may persist for several days until your body is used to not having the extra caffeine and sugar.    You also may be suffering from anxiety.     We also recommend follow-up with a primary care provider.  Information has been provided for same.   no

## 2024-02-05 ENCOUNTER — APPOINTMENT (OUTPATIENT)
Dept: CARDIOLOGY | Facility: CLINIC | Age: 74
End: 2024-02-05
Payer: MEDICARE

## 2024-02-05 VITALS
SYSTOLIC BLOOD PRESSURE: 163 MMHG | OXYGEN SATURATION: 97 % | DIASTOLIC BLOOD PRESSURE: 77 MMHG | BODY MASS INDEX: 26.9 KG/M2 | HEART RATE: 95 BPM | HEIGHT: 60 IN | WEIGHT: 137 LBS

## 2024-02-05 VITALS — SYSTOLIC BLOOD PRESSURE: 149 MMHG | DIASTOLIC BLOOD PRESSURE: 83 MMHG

## 2024-02-05 DIAGNOSIS — R01.1 CARDIAC MURMUR, UNSPECIFIED: ICD-10-CM

## 2024-02-05 DIAGNOSIS — R94.31 ABNORMAL ELECTROCARDIOGRAM [ECG] [EKG]: ICD-10-CM

## 2024-02-05 PROCEDURE — 99203 OFFICE O/P NEW LOW 30 MIN: CPT

## 2024-02-05 PROCEDURE — 93000 ELECTROCARDIOGRAM COMPLETE: CPT

## 2024-02-05 RX ORDER — EZETIMIBE 10 MG/1
10 TABLET ORAL
Refills: 0 | Status: ACTIVE | COMMUNITY

## 2024-02-05 RX ORDER — MONTELUKAST SODIUM 10 MG/1
TABLET, FILM COATED ORAL
Refills: 0 | Status: ACTIVE | COMMUNITY

## 2024-02-05 RX ORDER — LEVOTHYROXINE SODIUM 50 UG/1
50 TABLET ORAL
Refills: 0 | Status: ACTIVE | COMMUNITY

## 2024-02-05 NOTE — CARDIOLOGY SUMMARY
[de-identified] : 2/5/24: NSR, poor R wave progression [de-identified] : 5/2019: Normal LV function  [de-identified] : 5/2019: mild plaque

## 2024-02-05 NOTE — DISCUSSION/SUMMARY
[FreeTextEntry1] : Karli is a 73yoF PMH arthritis, asthma, carotid plaque, HLD who presents for pre-op evaluation  She presents today for pre-op clearance and for follow up for her carotid atherosclerosis.   She is able to perform > 4 METS without any symptoms. She does have a systolic murmur noted on exam, last TTE in 2019 without significant valvular disease. Will proceed with a TTE this week but otherwise she is optimized from a cardiac standpoint for her planned surgery pending her TTE results.   BP remains elevated today but she states her BP is normal at her PCPs office.   She will continue Zetia for her carotid atherosclerosis. She notes an intolerance to statins.   To follow up in 6-12 months.  [EKG obtained to assist in diagnosis and management of assessed problem(s)] : EKG obtained to assist in diagnosis and management of assessed problem(s)

## 2024-02-05 NOTE — PHYSICAL EXAM
[Well Developed] : well developed [Well Nourished] : well nourished [No Acute Distress] : no acute distress [Normal Conjunctiva] : normal conjunctiva [Normal Venous Pressure] : normal venous pressure [No Carotid Bruit] : no carotid bruit [Normal S1, S2] : normal S1, S2 [No Rub] : no rub [No Gallop] : no gallop [Clear Lung Fields] : clear lung fields [Good Air Entry] : good air entry [No Respiratory Distress] : no respiratory distress  [Soft] : abdomen soft [Non Tender] : non-tender [No Masses/organomegaly] : no masses/organomegaly [Normal Bowel Sounds] : normal bowel sounds [Normal Gait] : normal gait [No Edema] : no edema [No Cyanosis] : no cyanosis [No Clubbing] : no clubbing [No Varicosities] : no varicosities [No Rash] : no rash [No Skin Lesions] : no skin lesions [Moves all extremities] : moves all extremities [No Focal Deficits] : no focal deficits [Normal Speech] : normal speech [Alert and Oriented] : alert and oriented [Normal memory] : normal memory [de-identified] : 2/6 systolic murmur Erythromycin Pregnancy And Lactation Text: This medication is Pregnancy Category B and is considered safe during pregnancy. It is also excreted in breast milk.

## 2024-02-05 NOTE — HISTORY OF PRESENT ILLNESS
[FreeTextEntry1] : Karli is a 73yoF PMH arthritis, asthma, carotid plaque, HLD who presents for pre-op evaluation  States she has very bad reactions to statins as well and so is on Zetia States she has severe asthma and was sent to pulm and then was found to have a YURI nodule and is now planned for surgical removal (high probability of malignancy)

## 2024-02-06 ENCOUNTER — APPOINTMENT (OUTPATIENT)
Dept: CARDIOLOGY | Facility: CLINIC | Age: 74
End: 2024-02-06
Payer: MEDICARE

## 2024-02-06 PROCEDURE — 93306 TTE W/DOPPLER COMPLETE: CPT

## 2024-02-07 ENCOUNTER — APPOINTMENT (OUTPATIENT)
Dept: THORACIC SURGERY | Facility: CLINIC | Age: 74
End: 2024-02-07
Payer: MEDICARE

## 2024-02-07 VITALS
HEIGHT: 60 IN | DIASTOLIC BLOOD PRESSURE: 84 MMHG | HEART RATE: 81 BPM | BODY MASS INDEX: 27.48 KG/M2 | SYSTOLIC BLOOD PRESSURE: 185 MMHG | WEIGHT: 140 LBS | OXYGEN SATURATION: 97 %

## 2024-02-07 PROCEDURE — 99214 OFFICE O/P EST MOD 30 MIN: CPT

## 2024-02-07 NOTE — HISTORY OF PRESENT ILLNESS
[FreeTextEntry1] : Ms. LAURA VINCENT, 73 year old female, never smoker (+2nd smoke exposure), w/ hx of HLD, asthma, depression, who c/o cough x 1 yr, PCP Dr. Benavides referred patient to Pulm for further evaluation, sent for a CT chest which showed a new YURI nodule. Referred by Dr. Claus Almazan (Pulm) for evaluation.   PFTs on 10/24/23: FVC 1.96 L (81%), FEV1 1.36 L (71%), DLCO 59%  CT chest on 11/10/2023: (ZP) -  2.0 x 1.0 cm nodular consolidation anteromedial left upper lobe is new since 2007. Infectious, inflammatory and neoplastic etiologies are considered. Recommend direct comparison with any more recent prior studies. If these are not available, a follow-up CT the chest is recommended in 4-6 weeks after medical therapy - New reticular nodular densities inferior right upper lobe 4:158 - Mild bilateral bronchial wall thickening - 2-3 mm bilateral calcified granulomas  PET/CT on 11/18/2023: (ZP) - There is no focal abnormal FDG activity corresponding with a anterior medial aspect left upper lobe opacity of body and the mediastinum 1.9 x 0.9 cm, series 3 image 71). This appears unchanged from the CT chest of 11/10/2023.  Nodify blood test on 11/17/23: 87% genestrat blood tests on 11/27/23: negative for EGFR, ALK, KRAS, BRAF  Patient was evaluated by Dr. Orr on 12/04/2023, IR biopsy vs FNA (very high risk) vs VATS resection.  Patient is scheduled for FB, Left VATS, RA, wedge resection, MLND on 02/13/2024.   Patient is here today to discuss surgery. Today, patient denies worsening SOB, chest pain, cough, hemoptysis, fever, chills, night sweats, lightheadedness or dizziness.

## 2024-02-07 NOTE — ASSESSMENT
[FreeTextEntry1] : Ms. LAURA VINCENT, 73-year-old female, never smoker (+2nd smoke exposure), w/ history of HLD, asthma, depression, who c/o cough x 1 yr, PCP Dr. Benavides referred patient to Pulmonary for further evaluation, sent for a CT chest which showed a new YURI nodule. Referred by Dr. Claus Almazan for evaluation. I feel this lesion is likely benign given its appearance on PET/CT however malignancy is a possibility. Given its location Eliceo bronchoscopy and CT guided biopsies are not ideal. I feel the patient should have the lesion resected with possible lymph node dissection. Patient is scheduled for FB, Left VATS, RA, wedge resection, MLND on 02/13/2024. Patient is here today to discuss surgery. The risks benefits and alternatives of the procedure were explained to the patient including but not limited to the risks of bleeding, infection, prolonged air leak, shortness of breath, oxygen dependance, lymphatic leak, nerve injury and pain. All of her questions were answered she understood and freely consented to the procedure. I have asked the patient to obtain a repeat CT of the chest prior to the procedure.  - Patient hypertensive in office. Follow up with PCP or Card for further management.  - Recommendations reviewed with patient during this office visit, and all questions answered; Patient instructed on the importance of follow up and verbalizes understanding.  I, Dr. oTrey Rosenthal personally performed the evaluation and management (E/M) services for this established patient. That E/M includes conducting the examination, assessing all new/exacerbated conditions, and establishing a new plan of care. Today, My ACP, Lizeth Hopper, was here to observe my evaluation and management services for this patient to be followed going forward.

## 2024-02-07 NOTE — PHYSICAL EXAM
[] : no respiratory distress [Respiration, Rhythm And Depth] : normal respiratory rhythm and effort [Exaggerated Use Of Accessory Muscles For Inspiration] : no accessory muscle use [Auscultation Breath Sounds / Voice Sounds] : lungs were clear to auscultation bilaterally [Heart Rate And Rhythm] : heart rate was normal and rhythm regular [Examination Of The Chest] : the chest was normal in appearance [Chest Visual Inspection Thoracic Asymmetry] : no chest asymmetry [Diminished Respiratory Excursion] : normal chest expansion [2+] : left 2+ [Breast Appearance] : normal in appearance [Breast Palpation Mass] : no palpable masses [Bowel Sounds] : normal bowel sounds [Cervical Lymph Nodes Enlarged Posterior Bilaterally] : posterior cervical [Cervical Lymph Nodes Enlarged Anterior Bilaterally] : anterior cervical [Supraclavicular Lymph Nodes Enlarged Bilaterally] : supraclavicular [Involuntary Movements] : no involuntary movements were seen [Skin Color & Pigmentation] : normal skin color and pigmentation [Oriented To Time, Place, And Person] : oriented to person, place, and time

## 2024-02-08 ENCOUNTER — NON-APPOINTMENT (OUTPATIENT)
Age: 74
End: 2024-02-08

## 2024-02-12 ENCOUNTER — NON-APPOINTMENT (OUTPATIENT)
Age: 74
End: 2024-02-12

## 2024-02-13 ENCOUNTER — APPOINTMENT (OUTPATIENT)
Dept: THORACIC SURGERY | Facility: HOSPITAL | Age: 74
End: 2024-02-13

## 2024-05-06 NOTE — H&P PST ADULT - NEUROLOGICAL DETAILS
HPI:  Date of service: 05-06-24 @ 21:5  89-year-old female     without any prevalent significant past medical condition      chief complaint of shortness of breath ongoing the past couple of days or so.     Patient noted to be hypoxic by EMS to 88%.         arrives  with aide who a has paperwork , pt  e is DNR DNI/with the [employer] form.      REVIEW OF SYSTEMS:  CONSTITUTIONAL: No fever,  no  weight loss  ENT:  No  tinnitus,   no   vertigo  NECK: No pain or stiffness  RESPIRATORY: No cough, wheezing, chills or hemoptysis;    N + Shortness of Breath  CARDIOVASCULAR: No chest pain, palpitations, dizziness  GASTROINTESTINAL: No abdominal or epigastric pain. No nausea, vomiting, or hematemesis; No diarrhea  No melena or hematochezia.  GENITOURINARY: No dysuria, frequency, hematuria, or incontinence  NEUROLOGICAL: No headaches  SKIN: No itching,  no   rash  LYMPH Nodes: No enlarged glands  ENDOCRINE: No heat or cold intolerance  MUSCULOSKELETAL: No joint pain or swelling  PSYCHIATRIC: No depression, anxiety  HEME/LYMPH: No easy bruising, or bleeding gums  ALLERGY AND IMMUNOLOGIC: No hives or eczema	    Allergies    No Known Allergies    Intolerances        CAPILLARY BLOOD GLUCOSE        I&O's Summary      Vital Signs Last 24 Hrs  T(C): 37.9 (06 May 2024 20:00), Max: 37.9 (06 May 2024 20:00)  T(F): 100.3 (06 May 2024 20:00), Max: 100.3 (06 May 2024 20:00)  HR: 107 (06 May 2024 20:00) (100 - 107)  BP: 130/77 (06 May 2024 20:00) (130/77 - 145/94)  BP(mean): --  RR: 26 (06 May 2024 20:00) (26 - 26)  SpO2: 96% (06 May 2024 20:00) (90% - 96%)    Parameters below as of 06 May 2024 20:00  Patient On (Oxygen Delivery Method): nasal cannula  O2 Flow (L/min): 5      PHYSICAL EXAM:  Appearance: Normal	  HEENT:   Normal oral mucosa, PERRL, EOMI	  Lymphatic: No lymphadenopathy  Cardiovascular: Normal S1 S2, No JVD  Respiratory: Lungs clear to auscultation	  Psychiatry:    Mood & affect appropriate  Gastrointestinal:  Soft, Non-tender, + BS	  Skin: No rash, No ecchymoses	  Extremities: Normal range of motion  Vascular: Peripheral pulses palpable bilaterally    MEDICATIONS  (STANDING):  vancomycin  IVPB. 1000 milliGRAM(s) IV Intermittent once    MEDICATIONS  (PRN):    LABS:                        12.8   10.75 )-----------( 272      ( 06 May 2024 20:31 )             39.3     05-06    134<L>  |  96  |  18  ----------------------------<  143<H>  4.4   |  26  |  1.11    Ca    9.0      06 May 2024 20:31    TPro  7.3  /  Alb  3.8  /  TBili  0.3  /  DBili  x   /  AST  20  /  ALT  10  /  AlkPhos  101  05-06    PT/INR - ( 06 May 2024 20:31 )   PT: 12.1 sec;   INR: 1.16 ratio         PTT - ( 06 May 2024 20:31 )  PTT:28.7 sec      Urinalysis Basic - ( 06 May 2024 20:31 )    Color: x / Appearance: x / SG: x / pH: x  Gluc: 143 mg/dL / Ketone: x  / Bili: x / Urobili: x   Blood: x / Protein: x / Nitrite: x   Leuk Esterase: x / RBC: x / WBC x   Sq Epi: x / Non Sq Epi: x / Bacteria: x          05-06 @ 20:12  4.3  41              Consultant(s) Notes Reviewed:      Care Discussed with Consultants/Other Providers:  Plan:    HPI:  Date of service: 05-06-24 @ 21:5  89-year-old female     without any prevalent significant past medical condition      chief complaint of shortness of breath ongoing the past couple of days or so.     Patient noted to be hypoxic by EMS to 88%.         arrives  with aide who a has paperwork , pt  e is DNR DNI/with the [employer] form.      REVIEW OF SYSTEMS:  CONSTITUTIONAL: No fever,  no  weight loss  ENT:  No  tinnitus,   no   vertigo  NECK: No pain or stiffness  RESPIRATORY: No cough, wheezing, chills or hemoptysis;    N + Shortness of Breath  CARDIOVASCULAR: No chest pain, palpitations, dizziness  GASTROINTESTINAL: No abdominal or epigastric pain. No nausea, vomiting, or hematemesis; No diarrhea  No melena or hematochezia.  GENITOURINARY: No dysuria, frequency, hematuria, or incontinence  NEUROLOGICAL: No headaches  SKIN: No itching,  no   rash  LYMPH Nodes: No enlarged glands  ENDOCRINE: No heat or cold intolerance  MUSCULOSKELETAL: No joint pain or swelling  PSYCHIATRIC: No depression, anxiety  HEME/LYMPH: No easy bruising, or bleeding gums  ALLERGY AND IMMUNOLOGIC: No hives or eczema	    Allergies    No Known Allergies    Intolerances        CAPILLARY BLOOD GLUCOSE        I&O's Summary      Vital Signs Last 24 Hrs  T(C): 37.9 (06 May 2024 20:00), Max: 37.9 (06 May 2024 20:00)  T(F): 100.3 (06 May 2024 20:00), Max: 100.3 (06 May 2024 20:00)  HR: 107 (06 May 2024 20:00) (100 - 107)  BP: 130/77 (06 May 2024 20:00) (130/77 - 145/94)  BP(mean): --  RR: 26 (06 May 2024 20:00) (26 - 26)  SpO2: 96% (06 May 2024 20:00) (90% - 96%)    Parameters below as of 06 May 2024 20:00  Patient On (Oxygen Delivery Method): nasal cannula  O2 Flow (L/min): 5      PHYSICAL EXAM:  Appearance: Normal	  HEENT:   Normal oral mucosa, PERRL, EOMI	  Lymphatic: No lymphadenopathy  Cardiovascular: Normal S1 S2, No JVD  Respiratory: Lungs  diffuse  rhonchi, auydible  Psychiatry:    Mood & affect appropriate  Gastrointestinal:  Soft, Non-tender, + BS	  Skin: No rash, No ecchymoses	  Extremities: Normal range of motion  Vascular: Peripheral pulses palpable bilaterally    confused/  dementia    MEDICATIONS  (STANDING):  vancomycin  IVPB. 1000 milliGRAM(s) IV Intermittent once    MEDICATIONS  (PRN):    LABS:                        12.8   10.75 )-----------( 272      ( 06 May 2024 20:31 )             39.3     05-06    134<L>  |  96  |  18  ----------------------------<  143<H>  4.4   |  26  |  1.11    Ca    9.0      06 May 2024 20:31    TPro  7.3  /  Alb  3.8  /  TBili  0.3  /  DBili  x   /  AST  20  /  ALT  10  /  AlkPhos  101  05-06    PT/INR - ( 06 May 2024 20:31 )   PT: 12.1 sec;   INR: 1.16 ratio         PTT - ( 06 May 2024 20:31 )  PTT:28.7 sec      Urinalysis Basic - ( 06 May 2024 20:31 )    Color: x / Appearance: x / SG: x / pH: x  Gluc: 143 mg/dL / Ketone: x  / Bili: x / Urobili: x   Blood: x / Protein: x / Nitrite: x   Leuk Esterase: x / RBC: x / WBC x   Sq Epi: x / Non Sq Epi: x / Bacteria: x          05-06 @ 20:12  4.3  41              Consultant(s) Notes Reviewed:      Care Discussed with Consultants/Other Providers:  Plan: alert and oriented x 3

## 2024-05-10 PROBLEM — R91.1 LUNG NODULE: Status: ACTIVE | Noted: 2023-12-18

## 2024-05-15 ENCOUNTER — APPOINTMENT (OUTPATIENT)
Dept: THORACIC SURGERY | Facility: CLINIC | Age: 74
End: 2024-05-15
Payer: MEDICARE

## 2024-05-15 VITALS
SYSTOLIC BLOOD PRESSURE: 133 MMHG | HEIGHT: 60 IN | DIASTOLIC BLOOD PRESSURE: 84 MMHG | HEART RATE: 72 BPM | BODY MASS INDEX: 27.48 KG/M2 | OXYGEN SATURATION: 98 % | WEIGHT: 140 LBS

## 2024-05-15 DIAGNOSIS — R91.1 SOLITARY PULMONARY NODULE: ICD-10-CM

## 2024-05-15 PROCEDURE — G2211 COMPLEX E/M VISIT ADD ON: CPT

## 2024-05-15 PROCEDURE — 99213 OFFICE O/P EST LOW 20 MIN: CPT

## 2024-05-15 NOTE — HISTORY OF PRESENT ILLNESS
[FreeTextEntry1] : Ms. LAURA VINCENT, 73 year old female, never smoker (+2nd smoke exposure), w/ hx of HLD, asthma, depression, who c/o cough x 1 yr, PCP Dr. Benavides referred patient to Pulm for further evaluation, sent for a CT chest which showed a new YURI nodule. Referred by Dr. Claus Almazan (Pulm) for evaluation.   PFTs on 10/24/23: FVC 1.96 L (81%), FEV1 1.36 L (71%), DLCO 59%  CT chest on 11/10/2023: (ZP) -  2.0 x 1.0 cm nodular consolidation anteromedial left upper lobe is new since 2007. Infectious, inflammatory and neoplastic etiologies are considered. Recommend direct comparison with any more recent prior studies. If these are not available, a follow-up CT the chest is recommended in 4-6 weeks after medical therapy - New reticular nodular densities inferior right upper lobe 4:158 - Mild bilateral bronchial wall thickening - 2-3 mm bilateral calcified granulomas  PET/CT on 11/18/2023: (ZP) - There is no focal abnormal FDG activity corresponding with a anterior medial aspect left upper lobe opacity of body and the mediastinum 1.9 x 0.9 cm, series 3 image 71). This appears unchanged from the CT chest of 11/10/2023.  Nodify blood test on 11/17/23: 87% genestrat blood tests on 11/27/23: negative for EGFR, ALK, KRAS, BRAF  Patient was evaluated by Dr. Orr on 12/04/2023, IR biopsy vs FNA (very high risk) vs VATS resection.  Patient was scheduled for FB, Left VATS, RA, wedge resection, MLND on 02/13/2024. Repeat CT chest prior to surgery on 02/07/2024 reviewed by Dr. Rosenthal. Resolved dense focal consolidation YURI and lesser groundglass density RML w/o residual. Therefore, surgery on 02/13/2024 cancelled. Recommended patient to RTC in 3 months with CT chest w/o contrast.  CT chest on 05/07/2024: (ZP) - No change compared to February 2024.  - Punctate benign calcified granulomas are again noted. There is no suspicious lung nodule or consolidation. No evidence of pneumonia or acute pulmonary process. - Healing fractures of the left 8th, 9th and 11th ribs.  Patient is here today for a follow up. She reports that she had an asthma exacerbation -s/p 1 month of prednisone. Today, she admits to productive cough, SOB on exertion. She is following up with Dr. Almazan (pulm).

## 2024-05-15 NOTE — ASSESSMENT
[FreeTextEntry1] : Ms. LAURA VINCENT is a 73-year-old female, never smoker, w/ history of HLD, asthma and depression. The patient presented who c/o cough x 1 year a CT chest which showed a new UYRI nodule. Patient was scheduled for FB, Left VATS, wedge resection on 02/13/2024, however repeat CT chest prior to surgery on 02/07/2024 showed resolved dense focal consolidation YURI and lesser ground glass density RML w/o residual so the surgery was cancelled.   Pt feels well however her cough persists. Repeat CT of the chest shows small, calcified granulomas but no suspicious pulmonary nodules or consolidation.  I have asked the patient to obtain a repeat CT of the chest in 6 months and to follow up with me in my office at that time. I have answered all of her questions, and she understands the importance of follow up.    I, CLAY Rueda, personally performed the evaluation and management (E/M) services for this established patient who presents today with (a) new problem(s)/exacerbation of (an) existing condition(s). That E/M includes conducting the examination, assessing all new/exacerbated conditions, and establishing a new plan of care. Today, my ACP, JAVAN Causey was here to observe my evaluation and management services for this new problem/exacerbated condition to be followed going forward.

## 2024-05-21 DIAGNOSIS — Z12.39 ENCOUNTER FOR OTHER SCREENING FOR MALIGNANT NEOPLASM OF BREAST: ICD-10-CM

## 2024-05-21 DIAGNOSIS — Z11.3 ENCOUNTER FOR SCREENING FOR INFECTIONS WITH A PREDOMINANTLY SEXUAL MODE OF TRANSMISSION: ICD-10-CM

## 2024-05-22 ENCOUNTER — APPOINTMENT (OUTPATIENT)
Dept: OBGYN | Facility: CLINIC | Age: 74
End: 2024-05-22
Payer: MEDICARE

## 2024-05-22 ENCOUNTER — LABORATORY RESULT (OUTPATIENT)
Age: 74
End: 2024-05-22

## 2024-05-22 VITALS
SYSTOLIC BLOOD PRESSURE: 130 MMHG | BODY MASS INDEX: 27.48 KG/M2 | WEIGHT: 140 LBS | OXYGEN SATURATION: 98 % | HEIGHT: 60 IN | DIASTOLIC BLOOD PRESSURE: 60 MMHG | RESPIRATION RATE: 18 BRPM | HEART RATE: 91 BPM

## 2024-05-22 DIAGNOSIS — Z12.11 ENCOUNTER FOR SCREENING FOR MALIGNANT NEOPLASM OF COLON: ICD-10-CM

## 2024-05-22 DIAGNOSIS — Z80.0 FAMILY HISTORY OF MALIGNANT NEOPLASM OF DIGESTIVE ORGANS: ICD-10-CM

## 2024-05-22 DIAGNOSIS — Z87.81 PERSONAL HISTORY OF (HEALED) TRAUMATIC FRACTURE: ICD-10-CM

## 2024-05-22 DIAGNOSIS — Z01.419 ENCOUNTER FOR GYNECOLOGICAL EXAMINATION (GENERAL) (ROUTINE) W/OUT ABNORMAL FINDINGS: ICD-10-CM

## 2024-05-22 DIAGNOSIS — Z87.39 PERSONAL HISTORY OF OTHER DISEASES OF THE MUSCULOSKELETAL SYSTEM AND CONNECTIVE TISSUE: ICD-10-CM

## 2024-05-22 DIAGNOSIS — N95.2 POSTMENOPAUSAL ATROPHIC VAGINITIS: ICD-10-CM

## 2024-05-22 DIAGNOSIS — Z86.39 PERSONAL HISTORY OF OTHER ENDOCRINE, NUTRITIONAL AND METABOLIC DISEASE: ICD-10-CM

## 2024-05-22 DIAGNOSIS — Z12.4 ENCOUNTER FOR SCREENING FOR MALIGNANT NEOPLASM OF CERVIX: ICD-10-CM

## 2024-05-22 DIAGNOSIS — M81.0 AGE-RELATED OSTEOPOROSIS W/OUT CURRENT PATHOLOGICAL FRACTURE: ICD-10-CM

## 2024-05-22 DIAGNOSIS — Z13.31 ENCOUNTER FOR SCREENING FOR DEPRESSION: ICD-10-CM

## 2024-05-22 DIAGNOSIS — Z12.39 ENCOUNTER FOR OTHER SCREENING FOR MALIGNANT NEOPLASM OF BREAST: ICD-10-CM

## 2024-05-22 PROCEDURE — G0101: CPT

## 2024-05-22 PROCEDURE — G0444 DEPRESSION SCREEN ANNUAL: CPT

## 2024-05-22 PROCEDURE — 82270 OCCULT BLOOD FECES: CPT

## 2024-05-22 RX ORDER — BUDESONIDE, GLYCOPYRROLATE, AND FORMOTEROL FUMARATE 160; 9; 4.8 UG/1; UG/1; UG/1
160-9-4.8 AEROSOL, METERED RESPIRATORY (INHALATION)
Refills: 0 | Status: ACTIVE | COMMUNITY

## 2024-05-22 RX ORDER — GABAPENTIN 100 MG/1
CAPSULE ORAL
Refills: 0 | Status: ACTIVE | COMMUNITY

## 2024-05-22 RX ORDER — CETIRIZINE HCL 10 MG
10 TABLET ORAL
Refills: 0 | Status: ACTIVE | COMMUNITY

## 2024-05-22 RX ORDER — PREDNISONE 20 MG/1
20 TABLET ORAL
Refills: 0 | Status: ACTIVE | COMMUNITY

## 2024-05-22 RX ORDER — ALENDRONATE SODIUM 70 MG/1
TABLET ORAL
Refills: 0 | Status: ACTIVE | COMMUNITY

## 2024-05-22 RX ORDER — FLUTICASONE PROPIONATE 50 MCG
50 SPRAY, SUSPENSION NASAL
Refills: 0 | Status: ACTIVE | COMMUNITY

## 2024-05-22 RX ORDER — AZELASTINE HYDROCHLORIDE AND FLUTICASONE PROPIONATE 137; 50 UG/1; UG/1
137-50 SPRAY, METERED NASAL
Refills: 0 | Status: ACTIVE | COMMUNITY

## 2024-05-22 RX ORDER — TIZANIDINE 4 MG/1
TABLET ORAL
Refills: 0 | Status: ACTIVE | COMMUNITY

## 2024-05-22 NOTE — PHYSICAL EXAM
[Chaperone Present] : A chaperone was present in the examining room during all aspects of the physical examination [34792] : A chaperone was present during the pelvic exam. [Appropriately responsive] : appropriately responsive [Alert] : alert [No Acute Distress] : no acute distress [No Lymphadenopathy] : no lymphadenopathy [Soft] : soft [Non-tender] : non-tender [Non-distended] : non-distended [No HSM] : No HSM [No Lesions] : no lesions [No Mass] : no mass [Oriented x3] : oriented x3 [Examination Of The Breasts] : a normal appearance [No Masses] : no breast masses were palpable [Labia Majora] : normal [Labia Minora] : normal [Atrophy] : atrophy [Dry Mucosa] : dry mucosa Behavioral Problems (includes ADHD, Oppositionality) [Uterine Adnexae] : normal [Normal rectal exam] : was normal [Normal Brown Stool] : was normal and brown [Normal] : was normal [None] : there was no rectal mass  [FreeTextEntry2] : Jo Craft [FreeTextEntry3] : This note was written by Jo Craft on 05/22/2024, acting as a scribe for Dr. Mic Cervantes MD. All medic record entries were at my, Dr. Mic Cervantes MD, direction and personally dictated by me in 05/22/2024. I have personally reviewed the chart and agree that the record accurately reflects my personal performance of the history, physical exam, assessment, and plan.  [Occult Blood Positive] : was negative for occult blood analysis [Internal Hemorrhoid] : no internal hemorrhoids were present [External Hemorrhoid] : no external hemorrhoids were present [Skin Tags] : no residual hemorrhoidal skin tags

## 2024-05-22 NOTE — HISTORY OF PRESENT ILLNESS
[LMP unknown] : LMP unknown [postmenopausal] : postmenopausal [N] : Patient is not sexually active [Y] : Positive pregnancy history [Yes] : pregnancy [Post-Menopause, No Sxs] : post-menopausal, currently without symptoms [No] : Patient does not have concerns regarding sex [Previously active] : previously active [Men] : men [TextBox_4] : The patient presents today for a routine GYN exam. She offers no GYN complaints. Patient notes she has been sick for about a year and has a lung nodule that since resolved with no treatment. We reviewed together in detail her past medical and surgical histories, allergies and medication usage, social and family history. All questions were answered in easy-to-understand language. [Mammogramdate] : 5/12/2023 [BreastSonogramDate] : 5/12/2023 [BoneDensityDate] : 4/29/2022 [ColonoscopyDate] : 4/2023 [TextBox_43] : benign polyps

## 2024-05-24 LAB
HPV 16 E6+E7 MRNA CVX QL NAA+PROBE: NOT DETECTED
HPV18+45 E6+E7 MRNA CVX QL NAA+PROBE: NOT DETECTED

## 2024-05-28 RX ORDER — NYSTATIN 100000 [USP'U]/G
100000 CREAM TOPICAL TWICE DAILY
Qty: 1 | Refills: 3 | Status: ACTIVE | COMMUNITY
Start: 2023-02-21 | End: 1900-01-01

## 2024-05-28 RX ORDER — TRIAMCINOLONE ACETONIDE 1 MG/G
0.1 OINTMENT TOPICAL
Qty: 1 | Refills: 1 | Status: ACTIVE | COMMUNITY
Start: 2022-09-16 | End: 1900-01-01

## 2024-05-29 LAB — CYTOLOGY CVX/VAG DOC THIN PREP: NORMAL

## 2024-08-22 DIAGNOSIS — R91.8 OTHER NONSPECIFIC ABNORMAL FINDING OF LUNG FIELD: ICD-10-CM

## 2024-08-22 DIAGNOSIS — L40.9 PSORIASIS, UNSPECIFIED: ICD-10-CM

## 2024-08-22 RX ORDER — ALBUTEROL SULFATE 1.25 MG/3ML
1.25 SOLUTION RESPIRATORY (INHALATION)
Refills: 0 | Status: ACTIVE | COMMUNITY

## 2024-08-27 ENCOUNTER — APPOINTMENT (OUTPATIENT)
Dept: ORTHOPEDIC SURGERY | Facility: CLINIC | Age: 74
End: 2024-08-27
Payer: MEDICARE

## 2024-08-27 DIAGNOSIS — M17.12 UNILATERAL PRIMARY OSTEOARTHRITIS, LEFT KNEE: ICD-10-CM

## 2024-08-27 PROCEDURE — 99203 OFFICE O/P NEW LOW 30 MIN: CPT | Mod: 25

## 2024-08-27 PROCEDURE — 20611 DRAIN/INJ JOINT/BURSA W/US: CPT | Mod: LT

## 2024-08-27 PROCEDURE — 73564 X-RAY EXAM KNEE 4 OR MORE: CPT | Mod: LT

## 2024-08-28 NOTE — HISTORY OF PRESENT ILLNESS
[Meds] : meds [FreeTextEntry5] : 73 y/o F presents for NP eval of the Lt. knee today. Pt reports of chronic knee pain with no associated trauma. Hx of Rt. TKR. Use of brace for stability with walking. Denies any swelling. Notes knee has buckled in the past.

## 2024-08-28 NOTE — PROCEDURE
[Large Joint Injection] : Large joint injection [Left] : of the left [Knee] : knee [Pain] : pain [Inflammation] : inflammation [Alcohol] : alcohol [Betadine] : betadine [Ethyl Chloride sprayed topically] : ethyl chloride sprayed topically [Sterile technique used] : sterile technique used [___ cc    1%] : Lidocaine ~Vcc of 1%  [___ cc    0.25%] : Bupivacaine (Marcaine) ~Vcc of 0.25%  [___ cc    80mg] : Methylprednisolone (Depomedrol) ~Vcc of 80 mg  [] : Patient tolerated procedure well [Call if redness, pain or fever occur] : call if redness, pain or fever occur [Previous OTC use and PT nontherapeutic] : patient has tried OTC's including aspirin, Ibuprofen, Aleve, etc or prescription NSAIDS, and/or exercises at home and/or physical therapy without satisfactory response [Patient had decreased mobility in the joint] : patient had decreased mobility in the joint [Risks, benefits, alternatives discussed / Verbal consent obtained] : the risks benefits, and alternatives have been discussed, and verbal consent was obtained

## 2024-08-28 NOTE — ASSESSMENT
[FreeTextEntry1] : The patient is a 73 yo woman presenting with left knee pain of 3 weeks. She reports increased pain with ambulation and distance walking. She has pain with ADLs and going from sitting to standing. The patient has pain with stairs. She has pain at rest as well. She denies new trauma. She denies any paresthesias. The patient wishes to consider a CSI. She has had some relief in the past but has had none recently and is not interested in any surgery. She reports occasional buckling.  Left knee exam: Well appearing female in no apparent distress. No rashes, scars, or abrasions. Neurovascularly intact. Tender to palpation at medial joint line. Ranging from 0-120. Mild varus deformity. Anterior/posterior drawer negative, - Mcmurrays. - Lachmans. Screening exam of the left hip shows a full, painless ROM.  Left knee xrays taken today in office, 4 views WB - Moderate medial compartment OA noted with mild PFJ degeneration. No fractures or loose bodies. No obvious tumors, masses, or lesions  The patient received a left knee saline and depo injection due to a possible lidocaine vs epinephrine allergy. The patient was not a great historian. The patient tolerated it well. She will return as needed.

## 2024-08-28 NOTE — HISTORY OF PRESENT ILLNESS
[id number] [facility]     I agree to have a Peripherally Inserted Central Catheter (PICC) placed in my arm.    1. The PICC insertion procedure, care, maintenance, risks, benefits, and complications have been explained to me b [Meds] : meds also discussed reasonable alternatives to the PICC, including risks, benefits, and side effects related to the alternatives and risks related to not receiving this procedure.     8.  I have expressed any questions about this procedure to my physician or the [FreeTextEntry5] : 73 y/o F presents for NP eval of the Lt. knee today. Pt reports of chronic knee pain with no associated trauma. Hx of Rt. TKR. Use of brace for stability with walking. Denies any swelling. Notes knee has buckled in the past.

## 2024-08-28 NOTE — ASSESSMENT
[FreeTextEntry1] : The patient is a 75 yo woman presenting with left knee pain of 3 weeks. She reports increased pain with ambulation and distance walking. She has pain with ADLs and going from sitting to standing. The patient has pain with stairs. She has pain at rest as well. She denies new trauma. She denies any paresthesias. The patient wishes to consider a CSI. She has had some relief in the past but has had none recently and is not interested in any surgery. She reports occasional buckling.  Left knee exam: Well appearing female in no apparent distress. No rashes, scars, or abrasions. Neurovascularly intact. Tender to palpation at medial joint line. Ranging from 0-120. Mild varus deformity. Anterior/posterior drawer negative, - Mcmurrays. - Lachmans. Screening exam of the left hip shows a full, painless ROM.  Left knee xrays taken today in office, 4 views WB - Moderate medial compartment OA noted with mild PFJ degeneration. No fractures or loose bodies. No obvious tumors, masses, or lesions  The patient received a left knee saline and depo injection due to a possible lidocaine vs epinephrine allergy. The patient was not a great historian. The patient tolerated it well. She will return as needed.

## 2024-09-10 ENCOUNTER — APPOINTMENT (OUTPATIENT)
Facility: CLINIC | Age: 74
End: 2024-09-10
Payer: MEDICARE

## 2024-09-10 VITALS
TEMPERATURE: 98.2 F | HEART RATE: 70 BPM | DIASTOLIC BLOOD PRESSURE: 87 MMHG | SYSTOLIC BLOOD PRESSURE: 138 MMHG | RESPIRATION RATE: 17 BRPM | BODY MASS INDEX: 27.48 KG/M2 | WEIGHT: 140 LBS | OXYGEN SATURATION: 98 % | HEIGHT: 60 IN

## 2024-09-10 DIAGNOSIS — J45.909 UNSPECIFIED ASTHMA, UNCOMPLICATED: ICD-10-CM

## 2024-09-10 DIAGNOSIS — R91.8 OTHER NONSPECIFIC ABNORMAL FINDING OF LUNG FIELD: ICD-10-CM

## 2024-09-10 DIAGNOSIS — E78.5 HYPERLIPIDEMIA, UNSPECIFIED: ICD-10-CM

## 2024-09-10 PROCEDURE — 99214 OFFICE O/P EST MOD 30 MIN: CPT | Mod: 25

## 2024-09-10 PROCEDURE — 99204 OFFICE O/P NEW MOD 45 MIN: CPT | Mod: 25

## 2024-09-10 RX ORDER — AZITHROMYCIN 250 MG/1
250 TABLET, FILM COATED ORAL
Qty: 15 | Refills: 3 | Status: ACTIVE | COMMUNITY
Start: 2024-09-10 | End: 1900-01-01

## 2024-09-10 NOTE — PHYSICAL EXAM
[No Acute Distress] : no acute distress [Normal Oropharynx] : normal oropharynx [Normal Appearance] : normal appearance [No Neck Mass] : no neck mass [Normal Rate/Rhythm] : normal rate/rhythm [Normal S1, S2] : normal s1, s2 [No Murmurs] : no murmurs [No Resp Distress] : no resp distress [Normal to Percussion] : normal to percussion [Rhonchi] : rhonchi [No Abnormalities] : no abnormalities [Benign] : benign [Normal Gait] : normal gait [No Clubbing] : no clubbing [No Cyanosis] : no cyanosis [No Edema] : no edema [FROM] : FROM [Normal Color/ Pigmentation] : normal color/ pigmentation [No Focal Deficits] : no focal deficits [Oriented x3] : oriented x3 [Normal Affect] : normal affect

## 2024-09-10 NOTE — DISCUSSION/SUMMARY
[FreeTextEntry1] : Ms. Larios has persistent cough due to her asthma productive of sputum despite being on Breztri nebulizer and montelukast.  Will give Zithromax 250 mg every other day for 1 month.  Advised to use The Breather ( respiratory muscles ).  Advised to use Mucinex.  Advised  vaccines.  Patient had a left upper lobe mass with positive NODIFY test however this appears to have resolved.  She will have repeat CT of chest in 3 months as well as PFT and visit.

## 2024-09-10 NOTE — CONSULT LETTER
Vaccine Information Statement(s) was given today. This has been reviewed, questions answered, and verbal consent given by Patient for injection(s) and administration of Influenza (Inactivated) and Pneumococcal Polysaccharide (PPSV).    Patient tolerated without incident. See immunization grid for documentation.         [Dear  ___] : Dear  [unfilled], [Consult Letter:] : I had the pleasure of evaluating your patient, [unfilled]. [Please see my note below.] : Please see my note below. [Consult Closing:] : Thank you very much for allowing me to participate in the care of this patient.  If you have any questions, please do not hesitate to contact me. [Sincerely,] : Sincerely, [FreeTextEntry2] : Chino Benavides [FreeTextEntry3] :  Claus Almazan MD FACP FCCP

## 2024-09-10 NOTE — HISTORY OF PRESENT ILLNESS
[TextBox_4] : 74-year-old female who is still intermittently coughing thick sputum.  She uses a nebulizer as needed.  CT of the chest in May was improved with no clear left upper lobe nodule.

## 2024-09-25 ENCOUNTER — APPOINTMENT (OUTPATIENT)
Dept: ORTHOPEDIC SURGERY | Facility: CLINIC | Age: 74
End: 2024-09-25

## 2024-09-25 VITALS — BODY MASS INDEX: 27.48 KG/M2 | HEIGHT: 60 IN | WEIGHT: 140 LBS

## 2024-09-25 DIAGNOSIS — M17.12 UNILATERAL PRIMARY OSTEOARTHRITIS, LEFT KNEE: ICD-10-CM

## 2024-09-25 PROCEDURE — 99203 OFFICE O/P NEW LOW 30 MIN: CPT

## 2024-09-25 PROCEDURE — 20610 DRAIN/INJ JOINT/BURSA W/O US: CPT | Mod: LT

## 2024-09-28 ENCOUNTER — RESULT REVIEW (OUTPATIENT)
Age: 74
End: 2024-09-28

## 2024-09-29 NOTE — ASSESSMENT
[FreeTextEntry1] : The patient comes in today for follow-up on her left knee.  She continues to have pain in the left knee.  She aggravated in August of this year and now it started buckling.  She occasionally has minimal pain and then it becomes excruciating and it becomes minimal again.  She tried physical therapy which makes it worse.  She cannot take anti-inflammatories and a cortisone shot was not helpful.  She has trouble with walking distances and doing stairs and gets occasional pain at night.  Examination of the left lower extremity reveals a normal neurovascular exam.  Examination left knee reveals limited range of motion from 5 to 125 degrees with varus deformity.  There is medial joint line pain with no Luiz's sign or instability.  There is no redness, rashes or visible scars.  Review of her old x-ray shows mild medial joint space narrowing of about 50%.  Under sterile conditions the left knee was injected intra-articularly with 3 cc (10 mg/cc) of Gel-One.  She tolerated the procedure well.  Plan at this time skin MRI of the left knee to rule out a displaced medial meniscus tear.  Will decide on a course of action following the study.

## 2024-09-29 NOTE — HISTORY OF PRESENT ILLNESS
[Meds] : meds [Physical therapy] : physical therapy [FreeTextEntry5] : 75 y/o F presents for f/u eval of the Lt. knee today. Previous tx CSI with no relief.

## 2024-09-29 NOTE — HISTORY OF PRESENT ILLNESS
Patient attended pulmonary rehab today and reports she has been having some mild vertigo symptoms again. States she has had this intermittently for years but it has been much better since she had a crystal manipulation during one of her PT sessions this winter.    She is questioning if she can be scheduled for another vestibular therapy session for her vertigo. Contacted the [facility] and spoke with Pat who states [place] will need a new order before she can be scheduled for this.    Routed to PCP to place order for PT/vestibular therapy if approved.    Please let patient know if approved via the patient portal. Thank you!   [Meds] : meds [Physical therapy] : physical therapy [FreeTextEntry5] : 75 y/o F presents for f/u eval of the Lt. knee today. Previous tx CSI with no relief.

## 2024-10-01 ENCOUNTER — NON-APPOINTMENT (OUTPATIENT)
Age: 74
End: 2024-10-01

## 2024-10-02 ENCOUNTER — NON-APPOINTMENT (OUTPATIENT)
Age: 74
End: 2024-10-02

## 2024-10-11 NOTE — H&P PST ADULT - NSICDXPASTMEDICALHX_GEN_ALL_CORE_FT
Refill request sent to pharmacy on 10/11/24 for Montelukast, last OV on  2/22/24   PAST MEDICAL HISTORY:  Acute sinusitis     Back pain     Bronchial asthma recent broncohitis;  never intubated    Cataract, left     Constipation     Environmental and seasonal allergies     Heart murmur     HLD (hyperlipidemia)     Hypothyroidism     OA (osteoarthritis)     Paraesophageal hiatal hernia repair done    Psoriasis

## 2024-10-30 ENCOUNTER — APPOINTMENT (OUTPATIENT)
Dept: ORTHOPEDIC SURGERY | Facility: CLINIC | Age: 74
End: 2024-10-30
Payer: MEDICARE

## 2024-10-30 DIAGNOSIS — M17.12 UNILATERAL PRIMARY OSTEOARTHRITIS, LEFT KNEE: ICD-10-CM

## 2024-10-30 PROCEDURE — 99213 OFFICE O/P EST LOW 20 MIN: CPT

## 2024-11-12 ENCOUNTER — NON-APPOINTMENT (OUTPATIENT)
Age: 74
End: 2024-11-12

## 2024-11-20 ENCOUNTER — APPOINTMENT (OUTPATIENT)
Dept: THORACIC SURGERY | Facility: CLINIC | Age: 74
End: 2024-11-20
Payer: MEDICARE

## 2024-11-20 VITALS
HEART RATE: 97 BPM | OXYGEN SATURATION: 98 % | WEIGHT: 138 LBS | SYSTOLIC BLOOD PRESSURE: 172 MMHG | BODY MASS INDEX: 27.09 KG/M2 | DIASTOLIC BLOOD PRESSURE: 80 MMHG | HEIGHT: 60 IN

## 2024-11-20 DIAGNOSIS — R91.1 SOLITARY PULMONARY NODULE: ICD-10-CM

## 2024-11-20 PROCEDURE — G2211 COMPLEX E/M VISIT ADD ON: CPT

## 2024-11-20 PROCEDURE — 99213 OFFICE O/P EST LOW 20 MIN: CPT

## 2024-12-10 ENCOUNTER — APPOINTMENT (OUTPATIENT)
Facility: CLINIC | Age: 74
End: 2024-12-10
Payer: MEDICARE

## 2024-12-10 VITALS
BODY MASS INDEX: 27.48 KG/M2 | SYSTOLIC BLOOD PRESSURE: 160 MMHG | RESPIRATION RATE: 17 BRPM | WEIGHT: 140 LBS | HEIGHT: 60 IN | TEMPERATURE: 97.7 F | OXYGEN SATURATION: 99 % | DIASTOLIC BLOOD PRESSURE: 90 MMHG | HEART RATE: 78 BPM

## 2024-12-10 DIAGNOSIS — J45.909 UNSPECIFIED ASTHMA, UNCOMPLICATED: ICD-10-CM

## 2024-12-10 DIAGNOSIS — E78.5 HYPERLIPIDEMIA, UNSPECIFIED: ICD-10-CM

## 2024-12-10 DIAGNOSIS — R91.8 OTHER NONSPECIFIC ABNORMAL FINDING OF LUNG FIELD: ICD-10-CM

## 2024-12-10 PROCEDURE — 94729 DIFFUSING CAPACITY: CPT

## 2024-12-10 PROCEDURE — 94727 GAS DIL/WSHOT DETER LNG VOL: CPT

## 2024-12-10 PROCEDURE — 99212 OFFICE O/P EST SF 10 MIN: CPT | Mod: 25

## 2024-12-10 PROCEDURE — ZZZZZ: CPT

## 2024-12-10 PROCEDURE — 94060 EVALUATION OF WHEEZING: CPT

## 2024-12-10 RX ORDER — BUDESONIDE AND FORMOTEROL FUMARATE 160; 4.5 UG/1; UG/1
160-4.5 AEROSOL, METERED RESPIRATORY (INHALATION)
Qty: 1 | Refills: 5 | Status: ACTIVE | COMMUNITY
Start: 2024-12-10 | End: 1900-01-01

## 2024-12-17 NOTE — H&P PST ADULT - NSCAFFEINETYPE_GEN_ALL_CORE_SD
Copied from CRM #[id number]. Topic: MW Clinical Concerns - MW Routine RN Triage  >> Dec 16, 2024 11:25 AM Marsha YAP wrote:  Thuy Mobley called during working hours and mentioned a symptom(s) not on the Emergent symptom list.    Routine RN Triage provided by [facility].  Selected 'Wrap Up CRM' and created new Telephone Encounter after clicking 'Convert to Clinical Call'. Selected Reason For Call by searching symptom. Sent Routine Triage-Site message template and routed as routine priority per [facility] Site Dept KB page for Routine Triage Working Hrs support to appropriate clinician pool.  Readback full message.-- DO NOT REPLY / DO NOT REPLY ALL --  -- This inbox is not monitored. If this was sent to the wrong provider or department, reroute message to P ECO Reroute pool. --  -- Message is from Engagement Center Operations (ECO) --    Patient Name: Thuy Mobley    Specialist or PCP Name: Isaiah Ruiz MD  PCP - General    Symptoms: Hard Patch on stomach w/ Redness    Pregnant (females aged 13-60. If Yes, how long?) : N/A     Call Back # :  [phone]    Which State are you currently located in?: [place]     Name of Clinic Site / Acct# : [facility] - [address] - Primary Care         
Writer spoke to patient to offer appt today with ZHENG Hernandez, patient states symptoms have gone away, and she would like to hold off for now. Pt aware to contact office if symptoms return. Pt verbally acknowledged.   
coffee

## 2025-04-08 ENCOUNTER — APPOINTMENT (OUTPATIENT)
Facility: CLINIC | Age: 75
End: 2025-04-08
Payer: MEDICARE

## 2025-04-08 VITALS
HEIGHT: 60 IN | SYSTOLIC BLOOD PRESSURE: 170 MMHG | DIASTOLIC BLOOD PRESSURE: 72 MMHG | OXYGEN SATURATION: 97 % | WEIGHT: 140 LBS | BODY MASS INDEX: 27.48 KG/M2 | RESPIRATION RATE: 17 BRPM | HEART RATE: 73 BPM | TEMPERATURE: 98.4 F

## 2025-04-08 DIAGNOSIS — E78.5 HYPERLIPIDEMIA, UNSPECIFIED: ICD-10-CM

## 2025-04-08 DIAGNOSIS — R91.8 OTHER NONSPECIFIC ABNORMAL FINDING OF LUNG FIELD: ICD-10-CM

## 2025-04-08 DIAGNOSIS — J45.909 UNSPECIFIED ASTHMA, UNCOMPLICATED: ICD-10-CM

## 2025-04-08 DIAGNOSIS — R91.1 SOLITARY PULMONARY NODULE: ICD-10-CM

## 2025-04-08 PROCEDURE — 99213 OFFICE O/P EST LOW 20 MIN: CPT

## 2025-05-28 ENCOUNTER — EMERGENCY (EMERGENCY)
Facility: HOSPITAL | Age: 75
LOS: 1 days | End: 2025-05-28
Attending: STUDENT IN AN ORGANIZED HEALTH CARE EDUCATION/TRAINING PROGRAM | Admitting: STUDENT IN AN ORGANIZED HEALTH CARE EDUCATION/TRAINING PROGRAM
Payer: MEDICARE

## 2025-05-28 VITALS
SYSTOLIC BLOOD PRESSURE: 138 MMHG | RESPIRATION RATE: 20 BRPM | OXYGEN SATURATION: 98 % | DIASTOLIC BLOOD PRESSURE: 85 MMHG | HEART RATE: 88 BPM

## 2025-05-28 VITALS
DIASTOLIC BLOOD PRESSURE: 78 MMHG | SYSTOLIC BLOOD PRESSURE: 152 MMHG | OXYGEN SATURATION: 98 % | HEART RATE: 91 BPM | WEIGHT: 145.06 LBS | RESPIRATION RATE: 18 BRPM | TEMPERATURE: 98 F

## 2025-05-28 DIAGNOSIS — Z98.891 HISTORY OF UTERINE SCAR FROM PREVIOUS SURGERY: Chronic | ICD-10-CM

## 2025-05-28 DIAGNOSIS — Z98.890 OTHER SPECIFIED POSTPROCEDURAL STATES: Chronic | ICD-10-CM

## 2025-05-28 PROCEDURE — 99283 EMERGENCY DEPT VISIT LOW MDM: CPT | Mod: FS

## 2025-05-28 PROCEDURE — 99282 EMERGENCY DEPT VISIT SF MDM: CPT

## 2025-05-30 DIAGNOSIS — Z13.820 ENCOUNTER FOR SCREENING FOR OSTEOPOROSIS: ICD-10-CM

## 2025-06-03 ENCOUNTER — APPOINTMENT (OUTPATIENT)
Dept: OBGYN | Facility: CLINIC | Age: 75
End: 2025-06-03
Payer: MEDICARE

## 2025-06-03 VITALS
HEART RATE: 83 BPM | HEIGHT: 60 IN | RESPIRATION RATE: 14 BRPM | BODY MASS INDEX: 27.48 KG/M2 | SYSTOLIC BLOOD PRESSURE: 150 MMHG | WEIGHT: 140 LBS | DIASTOLIC BLOOD PRESSURE: 86 MMHG | OXYGEN SATURATION: 97 %

## 2025-06-03 DIAGNOSIS — Z12.39 ENCOUNTER FOR OTHER SCREENING FOR MALIGNANT NEOPLASM OF BREAST: ICD-10-CM

## 2025-06-03 DIAGNOSIS — M81.0 AGE-RELATED OSTEOPOROSIS W/OUT CURRENT PATHOLOGICAL FRACTURE: ICD-10-CM

## 2025-06-03 PROCEDURE — 99213 OFFICE O/P EST LOW 20 MIN: CPT

## 2025-06-17 ENCOUNTER — NON-APPOINTMENT (OUTPATIENT)
Age: 75
End: 2025-06-17

## 2025-07-01 ENCOUNTER — APPOINTMENT (OUTPATIENT)
Facility: CLINIC | Age: 75
End: 2025-07-01
Payer: MEDICARE

## 2025-07-01 VITALS
RESPIRATION RATE: 16 BRPM | BODY MASS INDEX: 27.88 KG/M2 | DIASTOLIC BLOOD PRESSURE: 82 MMHG | WEIGHT: 142 LBS | HEART RATE: 77 BPM | SYSTOLIC BLOOD PRESSURE: 150 MMHG | HEIGHT: 60 IN | OXYGEN SATURATION: 97 % | TEMPERATURE: 98.7 F

## 2025-07-01 PROBLEM — J45.901 MODERATE ASTHMA WITH ACUTE EXACERBATION, UNSPECIFIED WHETHER PERSISTENT: Status: ACTIVE | Noted: 2025-07-01

## 2025-07-01 PROCEDURE — 99214 OFFICE O/P EST MOD 30 MIN: CPT

## 2025-07-01 RX ORDER — AZITHROMYCIN 250 MG/1
250 TABLET, FILM COATED ORAL EVERY OTHER DAY
Qty: 22 | Refills: 2 | Status: ACTIVE | COMMUNITY
Start: 2025-07-01 | End: 1900-01-01

## 2025-08-11 ENCOUNTER — NON-APPOINTMENT (OUTPATIENT)
Age: 75
End: 2025-08-11

## 2025-08-13 ENCOUNTER — APPOINTMENT (OUTPATIENT)
Dept: THORACIC SURGERY | Facility: CLINIC | Age: 75
End: 2025-08-13
Payer: MEDICARE

## 2025-08-13 VITALS
DIASTOLIC BLOOD PRESSURE: 81 MMHG | HEIGHT: 60 IN | WEIGHT: 145 LBS | HEART RATE: 84 BPM | BODY MASS INDEX: 28.47 KG/M2 | SYSTOLIC BLOOD PRESSURE: 184 MMHG | OXYGEN SATURATION: 97 %

## 2025-08-13 DIAGNOSIS — R91.1 SOLITARY PULMONARY NODULE: ICD-10-CM

## 2025-08-13 PROCEDURE — 99214 OFFICE O/P EST MOD 30 MIN: CPT

## 2025-09-03 ENCOUNTER — RX RENEWAL (OUTPATIENT)
Age: 75
End: 2025-09-03

## 2025-09-03 RX ORDER — AZELASTINE HYDROCHLORIDE 137 UG/1
137 SPRAY, METERED NASAL
Qty: 1 | Refills: 3 | Status: ACTIVE | COMMUNITY
Start: 2025-09-03 | End: 1900-01-01